# Patient Record
Sex: FEMALE | Race: BLACK OR AFRICAN AMERICAN | NOT HISPANIC OR LATINO | Employment: FULL TIME | RURAL
[De-identification: names, ages, dates, MRNs, and addresses within clinical notes are randomized per-mention and may not be internally consistent; named-entity substitution may affect disease eponyms.]

---

## 2021-08-10 ENCOUNTER — OFFICE VISIT (OUTPATIENT)
Dept: FAMILY MEDICINE | Facility: CLINIC | Age: 31
End: 2021-08-10
Payer: OTHER GOVERNMENT

## 2021-08-10 DIAGNOSIS — Z11.52 ENCOUNTER FOR SCREENING FOR COVID-19: Primary | ICD-10-CM

## 2021-08-10 PROCEDURE — 87635 SARS-COV-2 (COVID-19) QUALITATIVE PCR: ICD-10-PCS | Mod: ,,, | Performed by: CLINICAL MEDICAL LABORATORY

## 2021-08-10 PROCEDURE — 99202 PR OFFICE/OUTPT VISIT, NEW, LEVL II, 15-29 MIN: ICD-10-PCS | Mod: GC,,, | Performed by: FAMILY MEDICINE

## 2021-08-10 PROCEDURE — 87635 SARS-COV-2 COVID-19 AMP PRB: CPT | Mod: ,,, | Performed by: CLINICAL MEDICAL LABORATORY

## 2021-08-10 PROCEDURE — 99202 OFFICE O/P NEW SF 15 MIN: CPT | Mod: GC,,, | Performed by: FAMILY MEDICINE

## 2021-08-11 LAB — SARS-COV-2 RNA RESP QL NAA+PROBE: NEGATIVE

## 2022-09-19 ENCOUNTER — LAB VISIT (OUTPATIENT)
Dept: PRIMARY CARE CLINIC | Facility: CLINIC | Age: 32
End: 2022-09-19

## 2022-09-19 DIAGNOSIS — Z02.83 ENCOUNTER FOR EMPLOYMENT-RELATED DRUG TESTING: Primary | ICD-10-CM

## 2022-09-19 PROCEDURE — 99000 SPECIMEN HANDLING OFFICE-LAB: CPT | Mod: ,,, | Performed by: NURSE PRACTITIONER

## 2022-09-19 PROCEDURE — 99000 PR SPECIMEN HANDLING,DR OFF->LAB: ICD-10-PCS | Mod: ,,, | Performed by: NURSE PRACTITIONER

## 2022-09-19 NOTE — PROGRESS NOTES
Subjective:       Patient ID: Namita Shelby is a 31 y.o. female.    Chief Complaint: No chief complaint on file.    HPI  Review of Systems      Objective:      Physical Exam    Assessment:       Problem List Items Addressed This Visit    None  Visit Diagnoses       Encounter for employment-related drug testing    -  Primary            Plan:       Drug testing only

## 2022-12-27 ENCOUNTER — OFFICE VISIT (OUTPATIENT)
Dept: FAMILY MEDICINE | Facility: CLINIC | Age: 32
End: 2022-12-27
Payer: MEDICAID

## 2022-12-27 VITALS
DIASTOLIC BLOOD PRESSURE: 63 MMHG | SYSTOLIC BLOOD PRESSURE: 109 MMHG | TEMPERATURE: 98 F | HEIGHT: 64 IN | BODY MASS INDEX: 27.11 KG/M2 | OXYGEN SATURATION: 99 % | WEIGHT: 158.81 LBS | HEART RATE: 79 BPM

## 2022-12-27 DIAGNOSIS — R30.0 DYSURIA: ICD-10-CM

## 2022-12-27 DIAGNOSIS — N30.01 ACUTE CYSTITIS WITH HEMATURIA: Primary | ICD-10-CM

## 2022-12-27 DIAGNOSIS — E04.9 GOITER: ICD-10-CM

## 2022-12-27 DIAGNOSIS — J02.9 SORE THROAT: ICD-10-CM

## 2022-12-27 DIAGNOSIS — E04.9 ENLARGED THYROID: ICD-10-CM

## 2022-12-27 LAB
BILIRUB SERPL-MCNC: NEGATIVE MG/DL
BLOOD URINE, POC: ABNORMAL
COLOR, POC UA: ABNORMAL
GLUCOSE UR QL STRIP: NEGATIVE
KETONES UR QL STRIP: ABNORMAL
LEUKOCYTE ESTERASE URINE, POC: ABNORMAL
NITRITE, POC UA: POSITIVE
PH, POC UA: 7
PROTEIN, POC: ABNORMAL
SPECIFIC GRAVITY, POC UA: 1.02
T4 FREE SERPL-MCNC: 0.96 NG/DL (ref 0.76–1.46)
TSH SERPL DL<=0.005 MIU/L-ACNC: 1.08 UIU/ML (ref 0.36–3.74)
UROBILINOGEN, POC UA: 2

## 2022-12-27 PROCEDURE — 87186 CULTURE, URINE: ICD-10-PCS | Mod: ,,, | Performed by: CLINICAL MEDICAL LABORATORY

## 2022-12-27 PROCEDURE — 84443 THYROID PANEL: ICD-10-PCS | Mod: ,,, | Performed by: CLINICAL MEDICAL LABORATORY

## 2022-12-27 PROCEDURE — 81003 URINALYSIS AUTO W/O SCOPE: CPT | Mod: QW,RHCUB | Performed by: NURSE PRACTITIONER

## 2022-12-27 PROCEDURE — 99051 PR MEDICAL SERVICES, EVE/WKEND/HOLIDAY: ICD-10-PCS | Mod: ,,, | Performed by: NURSE PRACTITIONER

## 2022-12-27 PROCEDURE — 96372 THER/PROPH/DIAG INJ SC/IM: CPT | Mod: ,,, | Performed by: NURSE PRACTITIONER

## 2022-12-27 PROCEDURE — 99051 MED SERV EVE/WKEND/HOLIDAY: CPT | Mod: ,,, | Performed by: NURSE PRACTITIONER

## 2022-12-27 PROCEDURE — 87086 CULTURE, URINE: ICD-10-PCS | Mod: ,,, | Performed by: CLINICAL MEDICAL LABORATORY

## 2022-12-27 PROCEDURE — 96372 PR INJECTION,THERAP/PROPH/DIAG2ST, IM OR SUBCUT: ICD-10-PCS | Mod: ,,, | Performed by: NURSE PRACTITIONER

## 2022-12-27 PROCEDURE — 87186 SC STD MICRODIL/AGAR DIL: CPT | Mod: ,,, | Performed by: CLINICAL MEDICAL LABORATORY

## 2022-12-27 PROCEDURE — 87077 CULTURE, URINE: ICD-10-PCS | Mod: ,,, | Performed by: CLINICAL MEDICAL LABORATORY

## 2022-12-27 PROCEDURE — 84439 THYROID PANEL: ICD-10-PCS | Mod: ,,, | Performed by: CLINICAL MEDICAL LABORATORY

## 2022-12-27 PROCEDURE — 99214 OFFICE O/P EST MOD 30 MIN: CPT | Mod: 25,,, | Performed by: NURSE PRACTITIONER

## 2022-12-27 PROCEDURE — 87077 CULTURE AEROBIC IDENTIFY: CPT | Mod: ,,, | Performed by: CLINICAL MEDICAL LABORATORY

## 2022-12-27 PROCEDURE — 99214 PR OFFICE/OUTPT VISIT, EST, LEVL IV, 30-39 MIN: ICD-10-PCS | Mod: 25,,, | Performed by: NURSE PRACTITIONER

## 2022-12-27 PROCEDURE — 87086 URINE CULTURE/COLONY COUNT: CPT | Mod: ,,, | Performed by: CLINICAL MEDICAL LABORATORY

## 2022-12-27 PROCEDURE — 84443 ASSAY THYROID STIM HORMONE: CPT | Mod: ,,, | Performed by: CLINICAL MEDICAL LABORATORY

## 2022-12-27 PROCEDURE — 84439 ASSAY OF FREE THYROXINE: CPT | Mod: ,,, | Performed by: CLINICAL MEDICAL LABORATORY

## 2022-12-27 RX ORDER — CEFTRIAXONE 1 G/1
1 INJECTION, POWDER, FOR SOLUTION INTRAMUSCULAR; INTRAVENOUS
Status: COMPLETED | OUTPATIENT
Start: 2022-12-27 | End: 2022-12-27

## 2022-12-27 RX ORDER — CEFUROXIME AXETIL 500 MG/1
500 TABLET ORAL 2 TIMES DAILY
Qty: 20 TABLET | Refills: 0 | Status: SHIPPED | OUTPATIENT
Start: 2022-12-27 | End: 2023-01-06

## 2022-12-27 RX ADMIN — CEFTRIAXONE 1 G: 1 INJECTION, POWDER, FOR SOLUTION INTRAMUSCULAR; INTRAVENOUS at 07:12

## 2022-12-28 NOTE — PROGRESS NOTES
"Subjective:       Patient ID: Namita Shelby is a 32 y.o. female.    Chief Complaint: Sore Throat (Left side of throat has been hurting for 1 1/2 wks) and Dysuria (Pain when urinating x 3 days)    Presents to clinic as above. No fever. On menses now.    Review of Systems   Constitutional:  Negative for chills, fever and malaise/fatigue.   HENT:  Positive for sore throat. Negative for congestion and ear discharge.    Respiratory: Negative.     Cardiovascular: Negative.    Gastrointestinal: Negative.    Genitourinary:  Positive for dysuria, frequency, hematuria and urgency. Negative for flank pain.        Reviewed family, medical, surgical, and social history.    Objective:      /63   Pulse 79   Temp 98.1 °F (36.7 °C)   Ht 5' 4" (1.626 m)   Wt 72 kg (158 lb 12.8 oz)   LMP 12/27/2022   SpO2 99%   BMI 27.26 kg/m²   Physical Exam  Vitals and nursing note reviewed.   Constitutional:       General: She is not in acute distress.     Appearance: Normal appearance. She is not ill-appearing, toxic-appearing or diaphoretic.   HENT:      Head: Normocephalic.      Right Ear: Tympanic membrane, ear canal and external ear normal.      Left Ear: Tympanic membrane, ear canal and external ear normal.      Nose: No congestion or rhinorrhea.      Mouth/Throat:      Mouth: Mucous membranes are moist.      Pharynx: Posterior oropharyngeal erythema present. No oropharyngeal exudate.   Neck:      Vascular: No carotid bruit.      Comments: Mild tenderness to left anterior cervical region. No lymphadenopathy. Thyroid enlarged and spongy.   Cardiovascular:      Rate and Rhythm: Normal rate and regular rhythm.      Heart sounds: Normal heart sounds.   Pulmonary:      Effort: Pulmonary effort is normal.      Breath sounds: Normal breath sounds.   Abdominal:      General: Abdomen is flat. Bowel sounds are normal. There is no distension.      Palpations: Abdomen is soft. There is no mass.      Tenderness: There is no abdominal " tenderness. There is no right CVA tenderness, left CVA tenderness, guarding or rebound.      Hernia: No hernia is present.   Musculoskeletal:      Cervical back: Normal range of motion and neck supple. Tenderness present. No rigidity.   Lymphadenopathy:      Cervical: No cervical adenopathy.   Skin:     General: Skin is warm and dry.      Capillary Refill: Capillary refill takes less than 2 seconds.   Neurological:      Mental Status: She is alert and oriented to person, place, and time.   Psychiatric:         Mood and Affect: Mood normal.         Behavior: Behavior normal.         Thought Content: Thought content normal.         Judgment: Judgment normal.          Office Visit on 12/27/2022   Component Date Value Ref Range Status    Color, UA 12/27/2022 Red   Final    Spec Grav UA 12/27/2022 1.025   Final    pH, UA 12/27/2022 7.0   Final    WBC, UA 12/27/2022 Small   Final    Nitrite, UA 12/27/2022 Positive   Final    Protein, POC 12/27/2022 100 mg   Final    Glucose, UA 12/27/2022 Negative   Final    Ketones, UA 12/27/2022 Trace   Final    Bilirubin, POC 12/27/2022 Negative   Final    Urobilinogen, UA 12/27/2022 2.0   Final    Blood, UA 12/27/2022 Large   Final    Free T4 12/27/2022 0.96  0.76 - 1.46 ng/dL Final    TSH 12/27/2022 1.080  0.358 - 3.740 uIU/mL Final      Assessment:       1. Acute cystitis with hematuria    2. Dysuria    3. Sore throat    4. Goiter          Plan:       Acute cystitis with hematuria  -     cefTRIAXone injection 1 g  -     cefUROXime (CEFTIN) 500 MG tablet; Take 1 tablet (500 mg total) by mouth 2 (two) times daily. for 10 days  Dispense: 20 tablet; Refill: 0  -     Urine culture; Future; Expected date: 12/27/2022    Dysuria  -     POCT URINALYSIS W/O SCOPE    Sore throat  -     cefTRIAXone injection 1 g  -     cefUROXime (CEFTIN) 500 MG tablet; Take 1 tablet (500 mg total) by mouth 2 (two) times daily. for 10 days  Dispense: 20 tablet; Refill: 0  -     Thyroid Panel; Future; Expected  date: 12/27/2022  -     US Thyroid; Future; Expected date: 12/27/2022    Goiter  -     Thyroid Panel; Future; Expected date: 12/27/2022  -     US Thyroid; Future; Expected date: 12/27/2022    F/U with referral clerk in 1 week  Drink plenty of fluids  RTC PRN          Risks, benefits, and side effects were discussed with the patient. All questions were answered to the fullest satisfaction of the patient, and pt verbalized understanding and agreement to treatment plan. Pt was to call with any new or worsening symptoms, or present to the ER.

## 2022-12-29 ENCOUNTER — HOSPITAL ENCOUNTER (EMERGENCY)
Facility: HOSPITAL | Age: 32
Discharge: HOME | End: 2022-12-29
Payer: MEDICAID

## 2022-12-29 VITALS
DIASTOLIC BLOOD PRESSURE: 58 MMHG | OXYGEN SATURATION: 99 % | TEMPERATURE: 98 F | SYSTOLIC BLOOD PRESSURE: 119 MMHG | HEIGHT: 64 IN | BODY MASS INDEX: 26.63 KG/M2 | WEIGHT: 156 LBS | RESPIRATION RATE: 16 BRPM | HEART RATE: 75 BPM

## 2022-12-29 DIAGNOSIS — E01.0 THYROMEGALY: Primary | ICD-10-CM

## 2022-12-29 LAB
ANION GAP SERPL CALCULATED.3IONS-SCNC: 11 MMOL/L (ref 7–16)
BASOPHILS # BLD AUTO: 0.03 K/UL (ref 0–0.2)
BASOPHILS NFR BLD AUTO: 0.5 % (ref 0–1)
BUN SERPL-MCNC: 9 MG/DL (ref 7–18)
BUN/CREAT SERPL: 16 (ref 6–20)
CALCIUM SERPL-MCNC: 9 MG/DL (ref 8.5–10.1)
CHLORIDE SERPL-SCNC: 104 MMOL/L (ref 98–107)
CO2 SERPL-SCNC: 28 MMOL/L (ref 21–32)
CREAT SERPL-MCNC: 0.58 MG/DL (ref 0.55–1.02)
DIFFERENTIAL METHOD BLD: ABNORMAL
EGFR (NO RACE VARIABLE) (RUSH/TITUS): 123 ML/MIN/1.73M²
EOSINOPHIL # BLD AUTO: 0.17 K/UL (ref 0–0.5)
EOSINOPHIL NFR BLD AUTO: 2.6 % (ref 1–4)
ERYTHROCYTE [DISTWIDTH] IN BLOOD BY AUTOMATED COUNT: 16.4 % (ref 11.5–14.5)
GLUCOSE SERPL-MCNC: 102 MG/DL (ref 74–106)
HCT VFR BLD AUTO: 36.2 % (ref 38–47)
HGB BLD-MCNC: 11.4 G/DL (ref 12–16)
IMM GRANULOCYTES # BLD AUTO: 0.01 K/UL (ref 0–0.04)
IMM GRANULOCYTES NFR BLD: 0.2 % (ref 0–0.4)
LYMPHOCYTES # BLD AUTO: 2.91 K/UL (ref 1–4.8)
LYMPHOCYTES NFR BLD AUTO: 44.2 % (ref 27–41)
MCH RBC QN AUTO: 25.9 PG (ref 27–31)
MCHC RBC AUTO-ENTMCNC: 31.5 G/DL (ref 32–36)
MCV RBC AUTO: 82.3 FL (ref 80–96)
MONOCYTES # BLD AUTO: 0.41 K/UL (ref 0–0.8)
MONOCYTES NFR BLD AUTO: 6.2 % (ref 2–6)
MPC BLD CALC-MCNC: 9.8 FL (ref 9.4–12.4)
NEUTROPHILS # BLD AUTO: 3.06 K/UL (ref 1.8–7.7)
NEUTROPHILS NFR BLD AUTO: 46.3 % (ref 53–65)
NRBC # BLD AUTO: 0 X10E3/UL
NRBC, AUTO (.00): 0 %
PLATELET # BLD AUTO: 337 K/UL (ref 150–400)
POTASSIUM SERPL-SCNC: 4.2 MMOL/L (ref 3.5–5.1)
RBC # BLD AUTO: 4.4 M/UL (ref 4.2–5.4)
SODIUM SERPL-SCNC: 139 MMOL/L (ref 136–145)
UA COMPLETE W REFLEX CULTURE PNL UR: ABNORMAL
WBC # BLD AUTO: 6.59 K/UL (ref 4.5–11)

## 2022-12-29 PROCEDURE — 80048 BASIC METABOLIC PNL TOTAL CA: CPT | Performed by: NURSE PRACTITIONER

## 2022-12-29 PROCEDURE — 99285 EMERGENCY DEPT VISIT HI MDM: CPT | Mod: 25

## 2022-12-29 PROCEDURE — 85025 COMPLETE CBC W/AUTO DIFF WBC: CPT | Performed by: NURSE PRACTITIONER

## 2022-12-29 PROCEDURE — 99284 EMERGENCY DEPT VISIT MOD MDM: CPT | Mod: ,,, | Performed by: NURSE PRACTITIONER

## 2022-12-29 PROCEDURE — 25500020 PHARM REV CODE 255: Performed by: NURSE PRACTITIONER

## 2022-12-29 PROCEDURE — 99284 PR EMERGENCY DEPT VISIT,LEVEL IV: ICD-10-PCS | Mod: ,,, | Performed by: NURSE PRACTITIONER

## 2022-12-29 PROCEDURE — 36415 COLL VENOUS BLD VENIPUNCTURE: CPT | Performed by: NURSE PRACTITIONER

## 2022-12-29 RX ADMIN — IOPAMIDOL 100 ML: 755 INJECTION, SOLUTION INTRAVENOUS at 03:12

## 2022-12-29 NOTE — DISCHARGE INSTRUCTIONS
Follow up with your primary care provider for continued care and management of your CT results. Return to the ED for worsening signs and symptoms or otherwise as needed.

## 2022-12-29 NOTE — ED PROVIDER NOTES
Encounter Date: 12/29/2022       History     Chief Complaint   Patient presents with    Sore Throat     33 y/o AAF with no known PMH presents with c/o sore throat, specifically localized to left side. States it feels swollen and painful with swallowing. Symptoms on going for the past 1.5-2 weeks.  Denies fevers, chills, shortness of breath. Started on ceftin two days ago by PCP for sore throat and UTI. States she was instructed to come to the ED if she had no improvement or if she thought her throat was getting worse. Note, chart review reveals she had Thyroid panel that was normal 2 days ago.     The history is provided by the patient and medical records.   Review of patient's allergies indicates:  No Known Allergies  No past medical history on file.  No past surgical history on file.  No family history on file.  Social History     Tobacco Use    Smoking status: Every Day     Types: Cigarettes     Passive exposure: Never    Smokeless tobacco: Never   Substance Use Topics    Alcohol use: Never    Drug use: Never     Review of Systems   Constitutional:  Negative for chills and fever.   HENT:  Positive for sore throat. Negative for trouble swallowing.    Respiratory:  Negative for cough, choking, shortness of breath, wheezing and stridor.    Cardiovascular:  Negative for chest pain and palpitations.   Gastrointestinal:  Negative for nausea and vomiting.   All other systems reviewed and are negative.    Physical Exam     Initial Vitals [12/29/22 1359]   BP Pulse Resp Temp SpO2   (!) 119/58 75 16 98 °F (36.7 °C) 99 %      MAP       --         Physical Exam    Constitutional: She appears well-developed and well-nourished. She is cooperative.   HENT:   Mouth/Throat: Mucous membranes are normal. Posterior oropharyngeal erythema present. No oropharyngeal exudate or posterior oropharyngeal edema.   Neck: Thyromegaly present.   Cardiovascular:  Normal rate, regular rhythm, normal heart sounds and normal pulses.            Pulmonary/Chest: Effort normal and breath sounds normal.   Abdominal: Abdomen is soft. Bowel sounds are normal. There is no abdominal tenderness.     Neurological: She is alert and oriented to person, place, and time.   Skin: Skin is warm, dry and intact. Capillary refill takes less than 2 seconds.   Psychiatric: She has a normal mood and affect. Her speech is normal and behavior is normal. Judgment and thought content normal. Cognition and memory are normal.       Medical Screening Exam   See Full Note    ED Course   Procedures  Labs Reviewed   CBC WITH DIFFERENTIAL - Abnormal; Notable for the following components:       Result Value    Hemoglobin 11.4 (*)     Hematocrit 36.2 (*)     MCH 25.9 (*)     MCHC 31.5 (*)     RDW 16.4 (*)     Neutrophils % 46.3 (*)     Lymphocytes % 44.2 (*)     Monocytes % 6.2 (*)     All other components within normal limits   BASIC METABOLIC PANEL - Normal   CBC W/ AUTO DIFFERENTIAL    Narrative:     The following orders were created for panel order CBC auto differential.  Procedure                               Abnormality         Status                     ---------                               -----------         ------                     CBC with Differential[[id number]]        Abnormal            Final result                 Please view results for these tests on the individual orders.          Imaging Results              CT Soft Tissue Neck With Contrast (Final result)  Result time 12/29/22 15:24:47      Final result by Wesley Batres MD (12/29/22 15:24:47)                   Impression:      37 mm mass at the left thoracic inlet which causes some deviation of the trachea to the right.  Ectopic thyroid goiter is suspected.  Consider obtaining nonemergent outpatient thyroid scan to document iodine uptake and establish thyroid origin.  Otherwise, nonemergent tissue sampling is recommended to help exclude neoplasm    There is no tonsillar abscess or definite acute  inflammatory process otherwise      Electronically signed by: Wesley Medardo  Date:    12/29/2022  Time:    15:24               Narrative:    EXAMINATION:  CT SOFT TISSUE NECK WITH CONTRAST    CLINICAL HISTORY:  Lymphadenopathy, neck;Goiter;sore throat; painful swallowing;.    COMPARISON:  No previous similar imaging study available    TECHNIQUE:  Spiral CT sections were obtained through the soft tissue neck following the IV administration of 100 mL of Isovue-370 without immediate complication. Sagittal and coronal multiplanar reconstruction images are also examined.    The CT examination was performed using one or more of the following dose reduction techniques: Automated exposure control, adjustment of the mA and kV according to patient's size, use of acute or iterative reconstruction techniques.    FINDINGS:  There is a heterogeneously dense soft tissue mass measuring 37 by 33 by 23 mm at the left thoracic inlet in a substernal location.  This comes in close proximity to the inferior aspect of the left lobe of the thyroid but does not definitely connect to the thyroid gland itself.  This is hyperdense to muscle but hypodense to normal thyroid tissue.  This does cause some deviation of the trachea to the right at the thoracic inlet.  There is no significant narrowing of the trachea.  This could represent ectopic thyroid goiter.  There is no fat or calcium density within this lesion to specifically suggest teratoma.  Nonemergent tissue sampling is recommended.  One could consider obtaining nonemergent outpatient thyroid scan to determine if there is iodine uptake within this lesion to confirm thyroid origin.    There is mild diffuse prominence of the thyroid gland itself otherwise.    There is no pathologic lymphadenopathy at the cervical level.  There is an occasional small shotty cervical lymph node which could be reactive in nature.    Larynx appears normal.  There is no tonsillar abscess.    There is normal  enhancement of the major vascular structures.    Partially visualized lung apices are clear.    There is no acute osseous abnormality.  There is reversal of the normal cervical lordosis which could be related to patient positioning or muscle spasm.                                       Medications   iopamidoL (ISOVUE-370) injection 100 mL (100 mLs Intravenous Given 12/29/22 0188)     Medical Decision Making:   Discussed CT findings with Dr. Galeas, ED Attending, will instruct patient to f/u with PCP for further testing and continued care and management. He is agreeable with tx plan.                  Clinical Impression:   Final diagnoses:  [E01.0] Thyromegaly (Primary)        ED Disposition Condition    Discharge Stable          ED Prescriptions    None       Follow-up Information       Follow up With Specialties Details Why Contact Info    Primary Care Provider  In 1 week               RONALD Pollard  12/29/22 3613       RONALD Pollard  12/29/22 7892

## 2022-12-29 NOTE — ED TRIAGE NOTES
Addended by: TORRI BOYCE on: 5/17/2017 11:17 AM     Modules accepted: Basilio Fernández     PRESENTS TO ER WITH REPORT OF SORE THROAT X 2 WEEKS. WENT TO [facility] THEN AND WAS TOLD TO COME TO [facility] IF PAIN GOT WORSE. REPORTS CURRENTLY ON ABX FOR UTI

## 2023-01-09 ENCOUNTER — HOSPITAL ENCOUNTER (OUTPATIENT)
Dept: RADIOLOGY | Facility: HOSPITAL | Age: 33
Discharge: HOME | End: 2023-01-09
Attending: NURSE PRACTITIONER
Payer: MEDICAID

## 2023-01-09 DIAGNOSIS — J02.9 SORE THROAT: ICD-10-CM

## 2023-01-09 DIAGNOSIS — E04.9 GOITER: ICD-10-CM

## 2023-01-09 PROCEDURE — 76536 US EXAM OF HEAD AND NECK: CPT | Mod: 26,,, | Performed by: RADIOLOGY

## 2023-01-09 PROCEDURE — 76536 US THYROID: ICD-10-PCS | Mod: 26,,, | Performed by: RADIOLOGY

## 2023-01-09 PROCEDURE — 76536 US EXAM OF HEAD AND NECK: CPT | Mod: TC

## 2023-01-10 ENCOUNTER — TELEPHONE (OUTPATIENT)
Dept: FAMILY MEDICINE | Facility: CLINIC | Age: 33
End: 2023-01-10
Payer: MEDICAID

## 2023-01-10 NOTE — TELEPHONE ENCOUNTER
Pt notified. Voiced understanding.----- Message from RONALD Montero sent at 1/9/2023  3:25 PM CST -----  Notify of mildly enlarged thyroid. I am referring to general surgery to have this followed. Thanks

## 2023-01-17 ENCOUNTER — OFFICE VISIT (OUTPATIENT)
Dept: SURGERY | Facility: CLINIC | Age: 33
End: 2023-01-17
Attending: SURGERY
Payer: MEDICAID

## 2023-01-17 DIAGNOSIS — E04.9 ENLARGED THYROID: ICD-10-CM

## 2023-01-17 DIAGNOSIS — E04.9 GOITER: ICD-10-CM

## 2023-01-17 DIAGNOSIS — E04.1 THYROID NODULE: Primary | ICD-10-CM

## 2023-01-17 PROCEDURE — 1159F MED LIST DOCD IN RCRD: CPT | Mod: CPTII,,, | Performed by: SURGERY

## 2023-01-17 PROCEDURE — 1159F PR MEDICATION LIST DOCUMENTED IN MEDICAL RECORD: ICD-10-PCS | Mod: CPTII,,, | Performed by: SURGERY

## 2023-01-17 PROCEDURE — 99203 PR OFFICE/OUTPT VISIT, NEW, LEVL III, 30-44 MIN: ICD-10-PCS | Mod: S$PBB,,, | Performed by: SURGERY

## 2023-01-17 PROCEDURE — 99203 OFFICE O/P NEW LOW 30 MIN: CPT | Mod: S$PBB,,, | Performed by: SURGERY

## 2023-01-17 PROCEDURE — 99213 OFFICE O/P EST LOW 20 MIN: CPT | Mod: PBBFAC | Performed by: SURGERY

## 2023-01-17 NOTE — PROGRESS NOTES
General Surgery History and Physical      Patient ID: Namita Morris is a 32 y.o. female.    Chief Complaint: Thyroid Problem      HPI:  32-year-old female who had a CT scan done recently for this persistent sore throat.  The scan revealed this substernal lesion likely an extension of her thyroid that was that was causing tracheal deviation towards the right.  She does complain of some mild dysphagia.  But does not feel a lump no pain.  She is no family history of thyroid cancer and never had a previous radiation to her neck.  No signs of hyper or hypothyroidism no changes in energy, weight fluctuations, bowel habit changes.    Review of Systems   Constitutional:  Negative for activity change, appetite change, fatigue and fever.   HENT:  Positive for sore throat and trouble swallowing.    Respiratory:  Negative for cough and shortness of breath.    Cardiovascular:  Negative for chest pain and palpitations.   Gastrointestinal:  Negative for abdominal distention, abdominal pain, blood in stool, constipation and diarrhea.   Genitourinary:  Negative for flank pain.   Musculoskeletal:  Negative for neck pain and neck stiffness.   Neurological:  Negative for weakness.     No current outpatient medications on file.     No current facility-administered medications for this visit.       Review of patient's allergies indicates:  No Known Allergies    History reviewed. No pertinent past medical history.    History reviewed. No pertinent surgical history.    History reviewed. No pertinent family history.    Social History     Socioeconomic History    Marital status: Single   Tobacco Use    Smoking status: Every Day     Types: Cigarettes     Passive exposure: Never    Smokeless tobacco: Never   Substance and Sexual Activity    Alcohol use: Never    Drug use: Never    Sexual activity: Yes       There were no vitals filed for this  visit.    Physical Exam  Constitutional:       General: She is not in acute distress.  HENT:      Head: Normocephalic.   Cardiovascular:      Rate and Rhythm: Normal rate and regular rhythm.      Pulses: Normal pulses.   Pulmonary:      Effort: Pulmonary effort is normal. No respiratory distress.      Breath sounds: Normal breath sounds.   Abdominal:      General: Abdomen is flat. There is no distension.      Palpations: Abdomen is soft.      Tenderness: There is no abdominal tenderness.   Musculoskeletal:         General: Normal range of motion.   Skin:     General: Skin is warm.   Neurological:      General: No focal deficit present.      Mental Status: She is oriented to person, place, and time.     EXAMINATION:  CT SOFT TISSUE NECK WITH CONTRAST     CLINICAL HISTORY:  Lymphadenopathy, neck;Goiter;sore throat; painful swallowing;.     COMPARISON:  No previous similar imaging study available     TECHNIQUE:  Spiral CT sections were obtained through the soft tissue neck following the IV administration of 100 mL of Isovue-370 without immediate complication. Sagittal and coronal multiplanar reconstruction images are also examined.     The CT examination was performed using one or more of the following dose reduction techniques: Automated exposure control, adjustment of the mA and kV according to patient's size, use of acute or iterative reconstruction techniques.     FINDINGS:  There is a heterogeneously dense soft tissue mass measuring 37 by 33 by 23 mm at the left thoracic inlet in a substernal location.  This comes in close proximity to the inferior aspect of the left lobe of the thyroid but does not definitely connect to the thyroid gland itself.  This is hyperdense to muscle but hypodense to normal thyroid tissue.  This does cause some deviation of the trachea to the right at the thoracic inlet.  There is no significant narrowing of the trachea.  This could represent ectopic thyroid goiter.  There is no fat or  calcium density within this lesion to specifically suggest teratoma.  Nonemergent tissue sampling is recommended.  One could consider obtaining nonemergent outpatient thyroid scan to determine if there is iodine uptake within this lesion to confirm thyroid origin.     There is mild diffuse prominence of the thyroid gland itself otherwise.     There is no pathologic lymphadenopathy at the cervical level.  There is an occasional small shotty cervical lymph node which could be reactive in nature.     Larynx appears normal.  There is no tonsillar abscess.     There is normal enhancement of the major vascular structures.     Partially visualized lung apices are clear.     There is no acute osseous abnormality.  There is reversal of the normal cervical lordosis which could be related to patient positioning or muscle spasm.     Impression:     37 mm mass at the left thoracic inlet which causes some deviation of the trachea to the right.  Ectopic thyroid goiter is suspected.  Consider obtaining nonemergent outpatient thyroid scan to document iodine uptake and establish thyroid origin.  Otherwise, nonemergent tissue sampling is recommended to help exclude neoplasm     There is no tonsillar abscess or definite acute inflammatory process otherwise        Electronically signed by: Wesley Batres  Date:                                            12/29/2022  Time:                                           15:24    Assessment & Plan:    Thyroid nodule  -     US FNA Biopsy w/ Imaging 1st Lesion; Future; Expected date: 01/17/2023    Goiter  -     Ambulatory referral/consult to General Surgery    Enlarged thyroid  -     Ambulatory referral/consult to General Surgery        Will send the patient for an FNA of this lesion hopefully ultrasound-guided to assess for what it is before considering operating.  Likely would need 1 regardless due to the size and the compression factor.         Patient called about the results of her FNA  which came back as thyroid tissue.  She would like to wait for now and she will come back in 6 months time for repeat ultrasound and will consider further evaluation possible esophagram to see her swallowing and see if this needs to be excised.  All questions were answered.

## 2023-01-30 ENCOUNTER — HOSPITAL ENCOUNTER (OUTPATIENT)
Dept: RADIOLOGY | Facility: HOSPITAL | Age: 33
Discharge: HOME | End: 2023-01-30
Attending: SURGERY
Payer: MEDICAID

## 2023-01-30 VITALS
SYSTOLIC BLOOD PRESSURE: 134 MMHG | DIASTOLIC BLOOD PRESSURE: 79 MMHG | RESPIRATION RATE: 20 BRPM | HEART RATE: 83 BPM | OXYGEN SATURATION: 100 %

## 2023-01-30 DIAGNOSIS — E04.1 THYROID NODULE: ICD-10-CM

## 2023-01-30 PROCEDURE — A4550 SURGICAL TRAYS: HCPCS

## 2023-01-30 PROCEDURE — 88305 TISSUE EXAM BY PATHOLOGIST: CPT | Mod: TC,MCY | Performed by: RADIOLOGY

## 2023-01-30 PROCEDURE — 88173 CYTOLOGY, FNA: ICD-10-PCS | Mod: 26,,, | Performed by: PATHOLOGY

## 2023-01-30 PROCEDURE — 10005 FNA BX W/US GDN 1ST LES: CPT

## 2023-01-30 PROCEDURE — 88173 CYTOPATH EVAL FNA REPORT: CPT | Mod: TC,MCY | Performed by: RADIOLOGY

## 2023-01-30 PROCEDURE — 10005 FNA BX W/US GDN 1ST LES: CPT | Mod: ,,, | Performed by: RADIOLOGY

## 2023-01-30 PROCEDURE — 88305 CYTOLOGY, FNA: ICD-10-PCS | Mod: 26,,, | Performed by: PATHOLOGY

## 2023-01-30 PROCEDURE — 88305 TISSUE EXAM BY PATHOLOGIST: CPT | Mod: 26,,, | Performed by: PATHOLOGY

## 2023-01-30 PROCEDURE — 88173 CYTOPATH EVAL FNA REPORT: CPT | Mod: 26,,, | Performed by: PATHOLOGY

## 2023-01-30 PROCEDURE — 10005 US FINE NEEDLE ASPIRATION BIOPSY, FIRST LESION: ICD-10-PCS | Mod: ,,, | Performed by: RADIOLOGY

## 2023-01-30 NOTE — PROGRESS NOTES
Left Thyroid/sub clavicular nodule FNA  Performed by Dr. Fito Espana  Consent obtained for fine-needle aspiration.  A formal timeout was called all staff present agreed to patient and procedure.  The neck was prepped with ChloraPrep and sterile field was established.  1% lidocaine was used as local anesthetic.  Under ultrasound guidance 3 fine-needle aspirations were taken from the subclavicular nodule on the left.  The puncture site was cleaned and bandaged.  The patient tolerated the procedure well there are no immediate postprocedure complications.

## 2023-01-31 LAB
ESTROGEN SERPL-MCNC: NORMAL PG/ML
LAB AP CLINICAL INFORMATION: NORMAL
LAB AP COMMENTS: NORMAL
LAB AP GROSS DESCRIPTION: NORMAL
T3RU NFR SERPL: NORMAL %

## 2023-02-08 DIAGNOSIS — E04.1 THYROID NODULE: Primary | ICD-10-CM

## 2023-03-25 ENCOUNTER — OFFICE VISIT (OUTPATIENT)
Dept: FAMILY MEDICINE | Facility: CLINIC | Age: 33
End: 2023-03-25
Payer: MEDICAID

## 2023-03-25 VITALS
OXYGEN SATURATION: 99 % | WEIGHT: 160 LBS | TEMPERATURE: 99 F | SYSTOLIC BLOOD PRESSURE: 121 MMHG | HEIGHT: 64 IN | HEART RATE: 91 BPM | RESPIRATION RATE: 18 BRPM | BODY MASS INDEX: 27.31 KG/M2 | DIASTOLIC BLOOD PRESSURE: 72 MMHG

## 2023-03-25 DIAGNOSIS — J32.9 SINUSITIS, UNSPECIFIED CHRONICITY, UNSPECIFIED LOCATION: ICD-10-CM

## 2023-03-25 DIAGNOSIS — J02.8 SORE THROAT (VIRAL): ICD-10-CM

## 2023-03-25 DIAGNOSIS — H10.10 ALLERGIC CONJUNCTIVITIS, UNSPECIFIED LATERALITY: ICD-10-CM

## 2023-03-25 DIAGNOSIS — J02.0 STREP THROAT: Primary | ICD-10-CM

## 2023-03-25 DIAGNOSIS — Z20.828 VIRAL DISEASE EXPOSURE: ICD-10-CM

## 2023-03-25 DIAGNOSIS — B97.89 SORE THROAT (VIRAL): ICD-10-CM

## 2023-03-25 DIAGNOSIS — R05.9 COUGH, UNSPECIFIED TYPE: ICD-10-CM

## 2023-03-25 LAB
CTP QC/QA: YES
CTP QC/QA: YES
FLUAV AG NPH QL: NEGATIVE
FLUBV AG NPH QL: NEGATIVE
S PYO RRNA THROAT QL PROBE: POSITIVE
SARS-COV-2 AG RESP QL IA.RAPID: NEGATIVE

## 2023-03-25 PROCEDURE — 96372 PR INJECTION,THERAP/PROPH/DIAG2ST, IM OR SUBCUT: ICD-10-PCS | Mod: ,,, | Performed by: NURSE PRACTITIONER

## 2023-03-25 PROCEDURE — 3078F DIAST BP <80 MM HG: CPT | Mod: CPTII,,, | Performed by: NURSE PRACTITIONER

## 2023-03-25 PROCEDURE — 1160F PR REVIEW ALL MEDS BY PRESCRIBER/CLIN PHARMACIST DOCUMENTED: ICD-10-PCS | Mod: CPTII,,, | Performed by: NURSE PRACTITIONER

## 2023-03-25 PROCEDURE — 96372 THER/PROPH/DIAG INJ SC/IM: CPT | Mod: ,,, | Performed by: NURSE PRACTITIONER

## 2023-03-25 PROCEDURE — 99051 MED SERV EVE/WKEND/HOLIDAY: CPT | Mod: ,,, | Performed by: NURSE PRACTITIONER

## 2023-03-25 PROCEDURE — 3074F SYST BP LT 130 MM HG: CPT | Mod: CPTII,,, | Performed by: NURSE PRACTITIONER

## 2023-03-25 PROCEDURE — 3008F PR BODY MASS INDEX (BMI) DOCUMENTED: ICD-10-PCS | Mod: CPTII,,, | Performed by: NURSE PRACTITIONER

## 2023-03-25 PROCEDURE — 99213 OFFICE O/P EST LOW 20 MIN: CPT | Mod: 25,,, | Performed by: NURSE PRACTITIONER

## 2023-03-25 PROCEDURE — 87880 STREP A ASSAY W/OPTIC: CPT | Mod: RHCUB | Performed by: NURSE PRACTITIONER

## 2023-03-25 PROCEDURE — 1160F RVW MEDS BY RX/DR IN RCRD: CPT | Mod: CPTII,,, | Performed by: NURSE PRACTITIONER

## 2023-03-25 PROCEDURE — 99051 PR MEDICAL SERVICES, EVE/WKEND/HOLIDAY: ICD-10-PCS | Mod: ,,, | Performed by: NURSE PRACTITIONER

## 2023-03-25 PROCEDURE — 3008F BODY MASS INDEX DOCD: CPT | Mod: CPTII,,, | Performed by: NURSE PRACTITIONER

## 2023-03-25 PROCEDURE — 99213 PR OFFICE/OUTPT VISIT, EST, LEVL III, 20-29 MIN: ICD-10-PCS | Mod: 25,,, | Performed by: NURSE PRACTITIONER

## 2023-03-25 PROCEDURE — 3078F PR MOST RECENT DIASTOLIC BLOOD PRESSURE < 80 MM HG: ICD-10-PCS | Mod: CPTII,,, | Performed by: NURSE PRACTITIONER

## 2023-03-25 PROCEDURE — 87428 SARSCOV & INF VIR A&B AG IA: CPT | Mod: RHCUB | Performed by: NURSE PRACTITIONER

## 2023-03-25 PROCEDURE — 1159F PR MEDICATION LIST DOCUMENTED IN MEDICAL RECORD: ICD-10-PCS | Mod: CPTII,,, | Performed by: NURSE PRACTITIONER

## 2023-03-25 PROCEDURE — 3074F PR MOST RECENT SYSTOLIC BLOOD PRESSURE < 130 MM HG: ICD-10-PCS | Mod: CPTII,,, | Performed by: NURSE PRACTITIONER

## 2023-03-25 PROCEDURE — 1159F MED LIST DOCD IN RCRD: CPT | Mod: CPTII,,, | Performed by: NURSE PRACTITIONER

## 2023-03-25 RX ORDER — DEXAMETHASONE SODIUM PHOSPHATE 4 MG/ML
6 INJECTION, SOLUTION INTRA-ARTICULAR; INTRALESIONAL; INTRAMUSCULAR; INTRAVENOUS; SOFT TISSUE
Status: COMPLETED | OUTPATIENT
Start: 2023-03-25 | End: 2023-03-25

## 2023-03-25 RX ORDER — OLOPATADINE HYDROCHLORIDE 1 MG/ML
1 SOLUTION/ DROPS OPHTHALMIC 2 TIMES DAILY
Qty: 5 ML | Refills: 0 | Status: SHIPPED | OUTPATIENT
Start: 2023-03-25 | End: 2023-10-18

## 2023-03-25 RX ORDER — PROMETHAZINE HYDROCHLORIDE AND DEXTROMETHORPHAN HYDROBROMIDE 6.25; 15 MG/5ML; MG/5ML
5 SYRUP ORAL EVERY 6 HOURS PRN
Qty: 150 ML | Refills: 0 | Status: SHIPPED | OUTPATIENT
Start: 2023-03-25 | End: 2023-04-04

## 2023-03-25 RX ORDER — CEFUROXIME AXETIL 500 MG/1
500 TABLET ORAL 2 TIMES DAILY
Qty: 20 TABLET | Refills: 0 | Status: SHIPPED | OUTPATIENT
Start: 2023-03-25 | End: 2023-04-04

## 2023-03-25 RX ADMIN — DEXAMETHASONE SODIUM PHOSPHATE 6 MG: 4 INJECTION, SOLUTION INTRA-ARTICULAR; INTRALESIONAL; INTRAMUSCULAR; INTRAVENOUS; SOFT TISSUE at 12:03

## 2023-03-25 NOTE — LETTER
March 25, 2023      [facility]  [address] [facility] [place] [address]  Phone: [phone]  Fax: [phone]       Patient: Namita Morris   YOB: 1990  Date of Visit: 03/25/2023    To Whom It May Concern:    uSsy Morris  was at [facility] on 03/25/2023. The patient may return to work/school on 3/27/2023 with no restrictions. If you have any questions or concerns, or if I can be of further assistance, please do not hesitate to contact me.    Sincerely,    Ivy Grace MA

## 2023-03-25 NOTE — PROGRESS NOTES
"Subjective:       Patient ID: Namita Morris is a 32 y.o. female.    Chief Complaint: Sore Throat (Patient has had a sore throat for 4 days.), Nasal Congestion (Patient says that she has a lot of nasal drainage and irritation which sometimes produces blood.), Headache (Of and on headache that started 4 days ago.), and Eye Drainage (Patient stated that both of her eyes have been draining. )    Presents to clinic as above. No fever.     Review of Systems   Constitutional:  Negative for chills, fever and malaise/fatigue.   HENT:  Positive for congestion, nosebleeds, sinus pain and sore throat. Negative for ear pain.    Eyes:  Positive for discharge and redness. Negative for blurred vision, double vision, photophobia and pain.   Respiratory:  Positive for cough. Negative for hemoptysis, sputum production, shortness of breath and wheezing.    Cardiovascular: Negative.    Neurological:  Positive for headaches.        Reviewed family, medical, surgical, and social history.    Objective:      /72 (BP Location: Left arm, Patient Position: Sitting, BP Method: Large (Automatic))   Pulse 91   Temp 98.6 °F (37 °C) (Oral)   Resp 18   Ht 5' 4" (1.626 m)   Wt 72.6 kg (160 lb)   LMP 03/06/2023 (Approximate)   SpO2 99%   BMI 27.46 kg/m²   Physical Exam  Vitals and nursing note reviewed.   Constitutional:       General: She is not in acute distress.     Appearance: Normal appearance. She is normal weight. She is not ill-appearing, toxic-appearing or diaphoretic.   HENT:      Head: Normocephalic.      Right Ear: Tympanic membrane, ear canal and external ear normal.      Left Ear: Tympanic membrane, ear canal and external ear normal.      Nose: Mucosal edema, congestion and rhinorrhea present. Rhinorrhea is clear.      Right Sinus: Frontal sinus tenderness present. No maxillary sinus tenderness.      Left Sinus: Frontal sinus tenderness present. No maxillary sinus tenderness.      Mouth/Throat:      Mouth: Mucous " membranes are moist.      Pharynx: Posterior oropharyngeal erythema present. No oropharyngeal exudate.   Eyes:      Comments: Mild redness to bilateral sclera and conjunctiva. Clear drainage.    Cardiovascular:      Rate and Rhythm: Normal rate and regular rhythm.      Heart sounds: Normal heart sounds.   Pulmonary:      Effort: Pulmonary effort is normal.      Breath sounds: Normal breath sounds.   Musculoskeletal:      Cervical back: Normal range of motion and neck supple. No rigidity or tenderness.   Lymphadenopathy:      Cervical: No cervical adenopathy.   Skin:     General: Skin is warm and dry.      Capillary Refill: Capillary refill takes less than 2 seconds.   Neurological:      Mental Status: She is alert and oriented to person, place, and time.   Psychiatric:         Mood and Affect: Mood normal.         Behavior: Behavior normal.         Thought Content: Thought content normal.         Judgment: Judgment normal.          Office Visit on 03/25/2023   Component Date Value Ref Range Status    SARS Coronavirus 2 Antigen 03/25/2023 Negative  Negative Final    Rapid Influenza A Ag 03/25/2023 Negative  Negative Final    Rapid Influenza B Ag 03/25/2023 Negative  Negative Final     Acceptable 03/25/2023 Yes   Final    Rapid Strep A Screen 03/25/2023 Positive (A)  Negative Final     Acceptable 03/25/2023 Yes   Final      Assessment:       1. Strep throat    2. Viral disease exposure    3. Sore throat (viral)    4. Sinusitis, unspecified chronicity, unspecified location    5. Allergic conjunctivitis, unspecified laterality    6. Cough, unspecified type          Plan:       Strep throat  -     cefUROXime (CEFTIN) 500 MG tablet; Take 1 tablet (500 mg total) by mouth 2 (two) times a day. for 10 days  Dispense: 20 tablet; Refill: 0    Viral disease exposure  -     POCT SARS-COV2 (COVID) with Flu Antigen    Sore throat (viral)  -     POCT rapid strep A    Sinusitis, unspecified chronicity,  unspecified location  -     cefUROXime (CEFTIN) 500 MG tablet; Take 1 tablet (500 mg total) by mouth 2 (two) times a day. for 10 days  Dispense: 20 tablet; Refill: 0  -     dexAMETHasone injection 6 mg    Allergic conjunctivitis, unspecified laterality  -     olopatadine (PATANOL) 0.1 % ophthalmic solution; Place 1 drop into both eyes 2 (two) times daily.  Dispense: 5 mL; Refill: 0  -     dexAMETHasone injection 6 mg    Cough, unspecified type  -     promethazine-dextromethorphan (PROMETHAZINE-DM) 6.25-15 mg/5 mL Syrp; Take 5 mLs by mouth every 6 (six) hours as needed (cough).  Dispense: 150 mL; Refill: 0  -     dexAMETHasone injection 6 mg    Drink plenty of fluids  RTC PRN          Risks, benefits, and side effects were discussed with the patient. All questions were answered to the fullest satisfaction of the patient, and pt verbalized understanding and agreement to treatment plan. Pt was to call with any new or worsening symptoms, or present to the ER.

## 2023-08-08 ENCOUNTER — HOSPITAL ENCOUNTER (OUTPATIENT)
Dept: RADIOLOGY | Facility: HOSPITAL | Age: 33
Discharge: HOME | End: 2023-08-08
Attending: SURGERY
Payer: MEDICAID

## 2023-08-08 ENCOUNTER — OFFICE VISIT (OUTPATIENT)
Dept: SURGERY | Facility: CLINIC | Age: 33
End: 2023-08-08
Attending: SURGERY
Payer: MEDICAID

## 2023-08-08 DIAGNOSIS — E04.1 THYROID NODULE: ICD-10-CM

## 2023-08-08 DIAGNOSIS — E04.1 THYROID NODULE: Primary | ICD-10-CM

## 2023-08-08 PROCEDURE — 76536 US THYROID: ICD-10-PCS | Mod: 26,,, | Performed by: RADIOLOGY

## 2023-08-08 PROCEDURE — 76536 US EXAM OF HEAD AND NECK: CPT | Mod: 26,,, | Performed by: RADIOLOGY

## 2023-08-08 PROCEDURE — 1159F MED LIST DOCD IN RCRD: CPT | Mod: CPTII,,, | Performed by: SURGERY

## 2023-08-08 PROCEDURE — 99213 OFFICE O/P EST LOW 20 MIN: CPT | Mod: PBBFAC | Performed by: SURGERY

## 2023-08-08 PROCEDURE — 1159F PR MEDICATION LIST DOCUMENTED IN MEDICAL RECORD: ICD-10-PCS | Mod: CPTII,,, | Performed by: SURGERY

## 2023-08-08 PROCEDURE — 76536 US EXAM OF HEAD AND NECK: CPT | Mod: TC

## 2023-08-08 PROCEDURE — 99213 OFFICE O/P EST LOW 20 MIN: CPT | Mod: S$PBB,,, | Performed by: SURGERY

## 2023-08-08 PROCEDURE — 99213 PR OFFICE/OUTPT VISIT, EST, LEVL III, 20-29 MIN: ICD-10-PCS | Mod: S$PBB,,, | Performed by: SURGERY

## 2023-08-08 NOTE — PROGRESS NOTES
General Surgery Progress Note    Subjective:      Patient ID: Namita Morris is a 32 y.o. female.    Chief Complaint: Thyroid Problem (6 mth f/u )      HPI:  32-year-old female here for follow-up for six-month FNA of a left lower thyroid nodule.  Patient is complaining of little bit more discomfort and pain in her neck.  Upper part.  Cut worse with swallowing.  Town dysphagia no shortness of breath.  Ultrasound performed today showed some increased vascularity possible thyroiditis.  Nodule similar in size.    History reviewed. No pertinent past medical history.  History reviewed. No pertinent surgical history.  Social History     Socioeconomic History    Marital status: Single   Tobacco Use    Smoking status: Every Day     Current packs/day: 0.00     Types: Cigarettes     Passive exposure: Never    Smokeless tobacco: Never   Substance and Sexual Activity    Alcohol use: Never    Drug use: Never    Sexual activity: Yes         Current Outpatient Medications:     olopatadine (PATANOL) 0.1 % ophthalmic solution, Place 1 drop into both eyes 2 (two) times daily., Disp: 5 mL, Rfl: 0  Review of patient's allergies indicates:  No Known Allergies    There were no vitals taken for this visit.    Review of Systems   Constitutional: Negative for chills, fever, weight gain and weight loss.   Eyes:  Negative for blurred vision.   Cardiovascular:  Negative for chest pain, claudication and palpitations.   Respiratory:  Negative for cough and shortness of breath.    Musculoskeletal:  Negative for muscle weakness.   Gastrointestinal:  Negative for abdominal pain, diarrhea, nausea and vomiting.   Neurological:  Negative for numbness and paresthesias.         Objective:     Constitutional: Well, No apparent distress  Mental Status: Alert and oriented  x 3  Eyes: Normal sclera, normal eyelid  Neck: Trachea midline, no masses on neck exam  Respiratory: Clear to  auscultation bilaterally with no wheezes/crackles/cough  Cardiac: Regular rate and rhythm, no murmur, rub, or gallops  Abdominal: Soft, non-tender, non-distented  Hernia: No appreciated hernias  Musculoskeletal: 5/5 strength no weakess no decreased range of montion  Neurologic: Cranial nerves II - XII intact    Labs/ Imaging: CBC:   Lab Results   Component Value Date/Time    WBC 6.59 12/29/2022 02:10 PM    RBC 4.40 12/29/2022 02:10 PM    HGB 11.4 (L) 12/29/2022 02:10 PM    HCT 36.2 (L) 12/29/2022 02:10 PM     12/29/2022 02:10 PM    MCV 82.3 12/29/2022 02:10 PM    MCH 25.9 (L) 12/29/2022 02:10 PM    MCHC 31.5 (L) 12/29/2022 02:10 PM     BMP:   Lab Results   Component Value Date/Time     12/29/2022 02:10 PM    CO2 28 12/29/2022 02:10 PM    BUN 9 12/29/2022 02:10 PM    CREATININE 0.58 12/29/2022 02:10 PM    CALCIUM 9.0 12/29/2022 02:10 PM     CMP:   Lab Results   Component Value Date/Time     12/29/2022 02:10 PM    CALCIUM 9.0 12/29/2022 02:10 PM     12/29/2022 02:10 PM    K 4.2 12/29/2022 02:10 PM    CO2 28 12/29/2022 02:10 PM     12/29/2022 02:10 PM    BUN 9 12/29/2022 02:10 PM    CREATININE 0.58 12/29/2022 02:10 PM           Assessment:             1. Thyroid nodule          Plan:       Orders Placed This Encounter    US Thyroid    Ambulatory referral/consult to Endocrinology     No follow-ups on file.      Referral to endocrine for possible thyroiditis management.  She will come back in 1 year with a repeat ultrasound of her neck to assess for the nodule there.  All questions were answered

## 2023-10-18 ENCOUNTER — OFFICE VISIT (OUTPATIENT)
Dept: FAMILY MEDICINE | Facility: CLINIC | Age: 33
End: 2023-10-18
Payer: MEDICAID

## 2023-10-18 VITALS
HEIGHT: 64 IN | RESPIRATION RATE: 18 BRPM | SYSTOLIC BLOOD PRESSURE: 112 MMHG | TEMPERATURE: 98 F | BODY MASS INDEX: 26.44 KG/M2 | HEART RATE: 83 BPM | WEIGHT: 154.88 LBS | DIASTOLIC BLOOD PRESSURE: 64 MMHG | OXYGEN SATURATION: 100 %

## 2023-10-18 DIAGNOSIS — R82.998 URINE WBC INCREASED: ICD-10-CM

## 2023-10-18 DIAGNOSIS — N76.0 ACUTE VAGINITIS: Primary | ICD-10-CM

## 2023-10-18 DIAGNOSIS — N39.0 URINARY TRACT INFECTION WITHOUT HEMATURIA, SITE UNSPECIFIED: ICD-10-CM

## 2023-10-18 DIAGNOSIS — N89.8 VAGINAL IRRITATION: ICD-10-CM

## 2023-10-18 LAB
B-HCG UR QL: NEGATIVE
BILIRUB SERPL-MCNC: NEGATIVE MG/DL
BLOOD URINE, POC: NEGATIVE
CANDIDA SPECIES: NEGATIVE
COLOR, POC UA: YELLOW
CTP QC/QA: YES
GARDNERELLA: POSITIVE
GLUCOSE UR QL STRIP: NEGATIVE
KETONES UR QL STRIP: NEGATIVE
LEUKOCYTE ESTERASE URINE, POC: NORMAL
NITRITE, POC UA: NEGATIVE
PH, POC UA: 6
PROTEIN, POC: NEGATIVE
SPECIFIC GRAVITY, POC UA: 1.01
TRICHOMONAS: NEGATIVE
UROBILINOGEN, POC UA: 0.2

## 2023-10-18 PROCEDURE — 87510 BACTERIAL VAGINOSIS: ICD-10-PCS | Mod: ,,, | Performed by: CLINICAL MEDICAL LABORATORY

## 2023-10-18 PROCEDURE — 87077 CULTURE, URINE: ICD-10-PCS | Mod: ,,, | Performed by: CLINICAL MEDICAL LABORATORY

## 2023-10-18 PROCEDURE — 87660 TRICHOMONAS VAGIN DIR PROBE: CPT | Mod: ,,, | Performed by: CLINICAL MEDICAL LABORATORY

## 2023-10-18 PROCEDURE — 87186 CULTURE, URINE: ICD-10-PCS | Mod: ,,, | Performed by: CLINICAL MEDICAL LABORATORY

## 2023-10-18 PROCEDURE — 3008F BODY MASS INDEX DOCD: CPT | Mod: CPTII,,, | Performed by: NURSE PRACTITIONER

## 2023-10-18 PROCEDURE — 99214 OFFICE O/P EST MOD 30 MIN: CPT | Mod: ,,, | Performed by: NURSE PRACTITIONER

## 2023-10-18 PROCEDURE — 87086 CULTURE, URINE: ICD-10-PCS | Mod: ,,, | Performed by: CLINICAL MEDICAL LABORATORY

## 2023-10-18 PROCEDURE — 1159F PR MEDICATION LIST DOCUMENTED IN MEDICAL RECORD: ICD-10-PCS | Mod: CPTII,,, | Performed by: NURSE PRACTITIONER

## 2023-10-18 PROCEDURE — 87086 URINE CULTURE/COLONY COUNT: CPT | Mod: ,,, | Performed by: CLINICAL MEDICAL LABORATORY

## 2023-10-18 PROCEDURE — 81025 URINE PREGNANCY TEST: CPT | Mod: RHCUB | Performed by: NURSE PRACTITIONER

## 2023-10-18 PROCEDURE — 87510 GARDNER VAG DNA DIR PROBE: CPT | Mod: ,,, | Performed by: CLINICAL MEDICAL LABORATORY

## 2023-10-18 PROCEDURE — 81003 URINALYSIS AUTO W/O SCOPE: CPT | Mod: RHCUB | Performed by: NURSE PRACTITIONER

## 2023-10-18 PROCEDURE — 87480 CANDIDA DNA DIR PROBE: CPT | Mod: ,,, | Performed by: CLINICAL MEDICAL LABORATORY

## 2023-10-18 PROCEDURE — 3078F PR MOST RECENT DIASTOLIC BLOOD PRESSURE < 80 MM HG: ICD-10-PCS | Mod: CPTII,,, | Performed by: NURSE PRACTITIONER

## 2023-10-18 PROCEDURE — 3078F DIAST BP <80 MM HG: CPT | Mod: CPTII,,, | Performed by: NURSE PRACTITIONER

## 2023-10-18 PROCEDURE — 87186 SC STD MICRODIL/AGAR DIL: CPT | Mod: ,,, | Performed by: CLINICAL MEDICAL LABORATORY

## 2023-10-18 PROCEDURE — 1159F MED LIST DOCD IN RCRD: CPT | Mod: CPTII,,, | Performed by: NURSE PRACTITIONER

## 2023-10-18 PROCEDURE — 3074F SYST BP LT 130 MM HG: CPT | Mod: CPTII,,, | Performed by: NURSE PRACTITIONER

## 2023-10-18 PROCEDURE — 99214 PR OFFICE/OUTPT VISIT, EST, LEVL IV, 30-39 MIN: ICD-10-PCS | Mod: ,,, | Performed by: NURSE PRACTITIONER

## 2023-10-18 PROCEDURE — 3008F PR BODY MASS INDEX (BMI) DOCUMENTED: ICD-10-PCS | Mod: CPTII,,, | Performed by: NURSE PRACTITIONER

## 2023-10-18 PROCEDURE — 3074F PR MOST RECENT SYSTOLIC BLOOD PRESSURE < 130 MM HG: ICD-10-PCS | Mod: CPTII,,, | Performed by: NURSE PRACTITIONER

## 2023-10-18 PROCEDURE — 87660 BACTERIAL VAGINOSIS: ICD-10-PCS | Mod: ,,, | Performed by: CLINICAL MEDICAL LABORATORY

## 2023-10-18 PROCEDURE — 87480 BACTERIAL VAGINOSIS: ICD-10-PCS | Mod: ,,, | Performed by: CLINICAL MEDICAL LABORATORY

## 2023-10-18 PROCEDURE — 87077 CULTURE AEROBIC IDENTIFY: CPT | Mod: ,,, | Performed by: CLINICAL MEDICAL LABORATORY

## 2023-10-18 RX ORDER — METRONIDAZOLE 500 MG/1
500 TABLET ORAL EVERY 12 HOURS
Qty: 14 TABLET | Refills: 0 | Status: SHIPPED | OUTPATIENT
Start: 2023-10-18 | End: 2023-10-25

## 2023-10-18 RX ORDER — FLUCONAZOLE 150 MG/1
150 TABLET ORAL
Qty: 2 TABLET | Refills: 0 | Status: SHIPPED | OUTPATIENT
Start: 2023-10-18 | End: 2023-10-26

## 2023-10-18 NOTE — PROGRESS NOTES
Subjective:       Patient ID: Namita Morris is a 32 y.o. female.    Chief Complaint: Vaginal Itching (X2 Days and irritation)    Vaginal itching and irritation    Review of Systems   Constitutional:  Negative for chills, fatigue and fever.   Genitourinary:  Positive for vaginal discharge and vaginal pain. Negative for dysuria, flank pain, frequency, genital sores and urgency.         Objective:      Physical Exam  Constitutional:       General: She is not in acute distress.     Appearance: Normal appearance. She is not ill-appearing, toxic-appearing or diaphoretic.   Eyes:      Pupils: Pupils are equal, round, and reactive to light.   Cardiovascular:      Rate and Rhythm: Normal rate and regular rhythm.      Pulses: Normal pulses.      Heart sounds: Normal heart sounds.   Pulmonary:      Effort: Pulmonary effort is normal.      Breath sounds: Normal breath sounds. No wheezing, rhonchi or rales.   Abdominal:      General: Bowel sounds are normal.      Palpations: Abdomen is soft.      Tenderness: There is no abdominal tenderness. There is no right CVA tenderness, left CVA tenderness or guarding.      Hernia: A hernia is present.   Genitourinary:     Vagina: Vaginal discharge present.   Musculoskeletal:         General: Normal range of motion.   Skin:     General: Skin is warm and dry.      Findings: No lesion.   Neurological:      Mental Status: She is alert and oriented to person, place, and time.   Psychiatric:         Mood and Affect: Mood normal.         Behavior: Behavior normal.         Thought Content: Thought content normal.         Judgment: Judgment normal.            Assessment:       1. Acute vaginitis    2. Vaginal irritation    3. Urine WBC increased        Plan:   Acute vaginitis    Vaginal irritation  -     POCT urine pregnancy  -     Bacterial Vaginosis; Future; Expected date: 10/18/2023  -     POCT URINALYSIS W/O SCOPE  -     fluconazole (DIFLUCAN) 150 MG Tab; Take 1 tablet (150 mg total) by  mouth every 7 days. for 2 doses  Dispense: 2 tablet; Refill: 0  -     metroNIDAZOLE (FLAGYL) 500 MG tablet; Take 1 tablet (500 mg total) by mouth every 12 (twelve) hours. for 14 doses  Dispense: 14 tablet; Refill: 0    Urine WBC increased         Risks, benefits, and side effects were discussed with the patient. All questions were answered to the fullest satisfaction of the patient, and pt verbalized understanding and agreement to treatment plan. Pt was to call with any new or worsening symptoms, or present to the ER

## 2023-10-21 LAB — UA COMPLETE W REFLEX CULTURE PNL UR: ABNORMAL

## 2023-10-22 RX ORDER — NITROFURANTOIN 25; 75 MG/1; MG/1
100 CAPSULE ORAL 2 TIMES DAILY
Qty: 14 CAPSULE | Refills: 0 | Status: SHIPPED | OUTPATIENT
Start: 2023-10-22

## 2023-10-22 NOTE — PROGRESS NOTES
I tried to call pt on all 3 numbers listed- the first two stated not accepting phone calls and the last one I left a voicemail to return call

## 2024-06-26 DIAGNOSIS — E04.1 THYROID NODULE: Primary | ICD-10-CM

## 2024-08-20 ENCOUNTER — HOSPITAL ENCOUNTER (OUTPATIENT)
Dept: RADIOLOGY | Facility: HOSPITAL | Age: 34
Discharge: HOME | End: 2024-08-20
Attending: SURGERY
Payer: MEDICAID

## 2024-08-20 ENCOUNTER — OFFICE VISIT (OUTPATIENT)
Dept: SURGERY | Facility: CLINIC | Age: 34
End: 2024-08-20
Attending: SURGERY
Payer: MEDICAID

## 2024-08-20 VITALS — RESPIRATION RATE: 16 BRPM

## 2024-08-20 DIAGNOSIS — J02.9 SORE THROAT: ICD-10-CM

## 2024-08-20 DIAGNOSIS — E04.1 THYROID NODULE: ICD-10-CM

## 2024-08-20 DIAGNOSIS — E04.1 THYROID NODULE: Primary | ICD-10-CM

## 2024-08-20 PROCEDURE — 76536 US EXAM OF HEAD AND NECK: CPT | Mod: 26,,, | Performed by: RADIOLOGY

## 2024-08-20 PROCEDURE — 99999 PR PBB SHADOW E&M-EST. PATIENT-LVL III: CPT | Mod: PBBFAC,,, | Performed by: SURGERY

## 2024-08-20 PROCEDURE — 99213 OFFICE O/P EST LOW 20 MIN: CPT | Mod: S$PBB,,, | Performed by: SURGERY

## 2024-08-20 PROCEDURE — 99213 OFFICE O/P EST LOW 20 MIN: CPT | Mod: PBBFAC,25 | Performed by: SURGERY

## 2024-08-20 PROCEDURE — 1159F MED LIST DOCD IN RCRD: CPT | Mod: CPTII,,, | Performed by: SURGERY

## 2024-08-20 PROCEDURE — 76536 US EXAM OF HEAD AND NECK: CPT | Mod: TC

## 2024-08-20 NOTE — PROGRESS NOTES
General Surgery Progress Note    Subjective:      Patient ID: Namita Morris is a 33 y.o. female.    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  She has been doing okay.  Having some persistent sore throat.  She states about once a week she gets it and has been going on for the last year and a half.  No nausea no vomiting no choking no pain no dysphagia.    History reviewed. No pertinent past medical history.  History reviewed. No pertinent surgical history.  Social History     Socioeconomic History    Marital status: Single   Tobacco Use    Smoking status: Every Day     Types: Cigarettes     Passive exposure: Never    Smokeless tobacco: Never   Substance and Sexual Activity    Alcohol use: Never    Drug use: Never    Sexual activity: Yes     Social Determinants of Health     Financial Resource Strain: Patient Declined (5/31/2024)    Overall Financial Resource Strain (CARDIA)     Difficulty of Paying Living Expenses: Patient declined   Food Insecurity: Patient Declined (5/31/2024)    Hunger Vital Sign     Worried About Running Out of Food in the Last Year: Patient declined     Ran Out of Food in the Last Year: Patient declined   Physical Activity: Unknown (5/31/2024)    Exercise Vital Sign     Days of Exercise per Week: 0 days   Stress: Stress Concern Present (5/31/2024)    [employer] - Occupational Stress Questionnaire     Feeling of Stress : Very much   Housing Stability: Unknown (5/31/2024)    Housing Stability Vital Sign     Unable to Pay for Housing in the Last Year: Patient declined         Current Outpatient Medications:     nitrofurantoin, macrocrystal-monohydrate, (MACROBID) 100 MG capsule, Take 1 capsule (100 mg total) by mouth 2 (two) times daily. (Patient not taking: Reported on 8/20/2024), Disp: 14 capsule, Rfl:  0  Review of patient's allergies indicates:  No Known Allergies    Resp 16     Review of Systems   Constitutional: Negative for chills, fever, weight gain and weight loss.   HENT:  Positive for sore throat.    Eyes:  Negative for blurred vision.   Cardiovascular:  Negative for chest pain, claudication and palpitations.   Respiratory:  Negative for cough and shortness of breath.    Musculoskeletal:  Negative for muscle weakness.   Gastrointestinal:  Negative for abdominal pain, diarrhea, nausea and vomiting.   Neurological:  Negative for numbness and paresthesias.         Objective:     Constitutional: Well, No apparent distress  Mental Status: Alert and oriented  x 3  Eyes: Normal sclera, normal eyelid  Neck: Trachea midline, no masses on neck exam  Respiratory: Clear to auscultation bilaterally with no wheezes/crackles/cough  Cardiac: Regular rate and rhythm, no murmur, rub, or gallops  Abdominal: Soft, non-tender, non-distented  Hernia: No appreciated hernias  Musculoskeletal: 5/5 strength no weakess no decreased range of montion  Neurologic: Cranial nerves II - XII intact    Labs/ Imaging: CBC:   Lab Results   Component Value Date/Time    WBC 6.59 12/29/2022 02:10 PM    RBC 4.40 12/29/2022 02:10 PM    HGB 11.4 (L) 12/29/2022 02:10 PM    HCT 36.2 (L) 12/29/2022 02:10 PM     12/29/2022 02:10 PM    MCV 82.3 12/29/2022 02:10 PM    MCH 25.9 (L) 12/29/2022 02:10 PM    MCHC 31.5 (L) 12/29/2022 02:10 PM     BMP:   Lab Results   Component Value Date/Time     12/29/2022 02:10 PM    CO2 28 12/29/2022 02:10 PM    BUN 9 12/29/2022 02:10 PM    CREATININE 0.58 12/29/2022 02:10 PM    CALCIUM 9.0 12/29/2022 02:10 PM           Assessment:             1. Thyroid nodule          Plan:       Orders Placed This Encounter    US Thyroid     No follow-ups on file.      She will come back in 1 year time with a repeat ultrasound.  ENT referral for the persistent sore throat.  He will come back sooner for any worsening  choking, neck pain, swelling, or any other concerning issues.

## 2024-08-21 ENCOUNTER — OFFICE VISIT (OUTPATIENT)
Dept: FAMILY MEDICINE | Facility: CLINIC | Age: 34
End: 2024-08-21
Payer: MEDICAID

## 2024-08-21 VITALS
OXYGEN SATURATION: 100 % | TEMPERATURE: 98 F | WEIGHT: 172 LBS | SYSTOLIC BLOOD PRESSURE: 116 MMHG | HEART RATE: 89 BPM | HEIGHT: 64 IN | DIASTOLIC BLOOD PRESSURE: 80 MMHG | BODY MASS INDEX: 29.37 KG/M2 | RESPIRATION RATE: 18 BRPM

## 2024-08-21 DIAGNOSIS — J02.9 ACUTE PHARYNGITIS, UNSPECIFIED ETIOLOGY: Primary | ICD-10-CM

## 2024-08-21 DIAGNOSIS — Z20.828 EXPOSURE TO VIRAL DISEASE: ICD-10-CM

## 2024-08-21 DIAGNOSIS — Z20.818 EXPOSURE TO STREP THROAT: ICD-10-CM

## 2024-08-21 DIAGNOSIS — R05.9 COUGH, UNSPECIFIED TYPE: ICD-10-CM

## 2024-08-21 LAB
CTP QC/QA: YES
MOLECULAR STREP A: NEGATIVE
POC MOLECULAR INFLUENZA A AGN: NEGATIVE
POC MOLECULAR INFLUENZA B AGN: NEGATIVE
SARS-COV-2 RDRP RESP QL NAA+PROBE: NEGATIVE

## 2024-08-21 PROCEDURE — 3074F SYST BP LT 130 MM HG: CPT | Mod: CPTII,,, | Performed by: NURSE PRACTITIONER

## 2024-08-21 PROCEDURE — 87651 STREP A DNA AMP PROBE: CPT | Mod: RHCUB | Performed by: NURSE PRACTITIONER

## 2024-08-21 PROCEDURE — 3079F DIAST BP 80-89 MM HG: CPT | Mod: CPTII,,, | Performed by: NURSE PRACTITIONER

## 2024-08-21 PROCEDURE — 87502 INFLUENZA DNA AMP PROBE: CPT | Mod: RHCUB | Performed by: NURSE PRACTITIONER

## 2024-08-21 PROCEDURE — 3008F BODY MASS INDEX DOCD: CPT | Mod: CPTII,,, | Performed by: NURSE PRACTITIONER

## 2024-08-21 PROCEDURE — 1159F MED LIST DOCD IN RCRD: CPT | Mod: CPTII,,, | Performed by: NURSE PRACTITIONER

## 2024-08-21 PROCEDURE — 99214 OFFICE O/P EST MOD 30 MIN: CPT | Mod: ,,, | Performed by: NURSE PRACTITIONER

## 2024-08-21 PROCEDURE — 87635 SARS-COV-2 COVID-19 AMP PRB: CPT | Mod: RHCUB | Performed by: NURSE PRACTITIONER

## 2024-08-21 RX ORDER — AMOXICILLIN 500 MG/1
500 CAPSULE ORAL EVERY 12 HOURS
Qty: 20 CAPSULE | Refills: 0 | Status: SHIPPED | OUTPATIENT
Start: 2024-08-21 | End: 2024-08-31

## 2024-08-21 NOTE — PROGRESS NOTES
Subjective:       Patient ID: Namita Morris is a 33 y.o. female.    Chief Complaint: Sore Throat, Nasal Congestion, Chest Pain, Cough, and Generalized Body Aches    Sore Throat, Nasal Congestion, Chest Pain, Cough, and Generalized Body Aches (Pt. States- son has strep throat but her symptoms started later than his)      Sore Throat   Associated symptoms include congestion and coughing. Pertinent negatives include no abdominal pain, ear pain, headaches, neck pain, shortness of breath or vomiting.   Chest Pain   Associated symptoms include a cough. Pertinent negatives include no abdominal pain, back pain, dizziness, fever, headaches, nausea, shortness of breath, vomiting or weakness.   Cough  Associated symptoms include myalgias and a sore throat. Pertinent negatives include no ear pain, fever, headaches, rash or shortness of breath.     Review of Systems   Constitutional:  Negative for appetite change, fatigue and fever.   HENT:  Positive for nasal congestion and sore throat. Negative for ear pain.    Eyes:  Negative for pain, discharge and itching.   Respiratory:  Positive for cough. Negative for shortness of breath.    Cardiovascular:  Negative for leg swelling.   Gastrointestinal:  Negative for abdominal pain, change in bowel habit, nausea and vomiting.   Musculoskeletal:  Positive for myalgias. Negative for back pain, gait problem and neck pain.   Integumentary:  Negative for rash and wound.   Allergic/Immunologic: Negative for immunocompromised state.   Neurological:  Negative for dizziness, weakness and headaches.   All other systems reviewed and are negative.        Objective:      Physical Exam  Vitals and nursing note reviewed.   Constitutional:       General: She is not in acute distress.     Appearance: Normal appearance. She is not ill-appearing, toxic-appearing or diaphoretic.   HENT:      Head: Normocephalic.      Right Ear: Tympanic membrane, ear canal and external ear normal.      Left Ear:  Tympanic membrane, ear canal and external ear normal.      Nose: Congestion present. No rhinorrhea.      Mouth/Throat:      Mouth: Mucous membranes are moist.      Pharynx: Posterior oropharyngeal erythema present. No oropharyngeal exudate.   Eyes:      General: No scleral icterus.        Right eye: No discharge.         Left eye: No discharge.      Extraocular Movements: Extraocular movements intact.      Conjunctiva/sclera: Conjunctivae normal.      Pupils: Pupils are equal, round, and reactive to light.   Cardiovascular:      Rate and Rhythm: Normal rate and regular rhythm.      Pulses: Normal pulses.      Heart sounds: Normal heart sounds. No murmur heard.  Pulmonary:      Effort: Pulmonary effort is normal. No respiratory distress.      Breath sounds: Normal breath sounds. No wheezing, rhonchi or rales.   Musculoskeletal:         General: Normal range of motion.      Cervical back: Neck supple. Tenderness present.   Lymphadenopathy:      Cervical: Cervical adenopathy present.   Skin:     General: Skin is warm and dry.      Capillary Refill: Capillary refill takes less than 2 seconds.      Findings: No rash.   Neurological:      Mental Status: She is alert and oriented to person, place, and time.   Psychiatric:         Mood and Affect: Mood normal.         Behavior: Behavior normal.         Thought Content: Thought content normal.         Judgment: Judgment normal.            Assessment:       1. Acute pharyngitis, unspecified etiology    2. Exposure to viral disease    3. Cough, unspecified type    4. Exposure to strep throat        Plan:   Acute pharyngitis, unspecified etiology  -     amoxicillin (AMOXIL) 500 MG capsule; Take 1 capsule (500 mg total) by mouth every 12 (twelve) hours. for 10 days  Dispense: 20 capsule; Refill: 0    Exposure to viral disease  -     POCT COVID-19 Rapid Screening  -     POCT Strep A, Molecular    Cough, unspecified type  -     POCT Influenza A/B Molecular    Exposure to strep  throat  -     amoxicillin (AMOXIL) 500 MG capsule; Take 1 capsule (500 mg total) by mouth every 12 (twelve) hours. for 10 days  Dispense: 20 capsule; Refill: 0           Risks, benefits, and side effects were discussed with the patient. All questions were answered to the fullest satisfaction of the patient, and pt verbalized understanding and agreement to treatment plan. Pt was to call with any new or worsening symptoms, or present to the ER

## 2024-08-21 NOTE — LETTER
August 21, 2024      [facility]  [address] [place] [address]  Phone: [phone]  Fax: [phone]       Patient: Namita Morris   YOB: 1990  Date of Visit: 08/21/2024    To Whom It May Concern:    Susy Morris  was at [facility] on 08/21/2024. The patient may return to work/school on 8/23/24 with no restrictions. If you have any questions or concerns, or if I can be of further assistance, please do not hesitate to contact me.    Sincerely,    RONALD Bryan

## 2024-08-23 ENCOUNTER — OFFICE VISIT (OUTPATIENT)
Dept: OTOLARYNGOLOGY | Facility: CLINIC | Age: 34
End: 2024-08-23
Attending: SURGERY
Payer: MEDICAID

## 2024-08-23 VITALS — WEIGHT: 156 LBS | BODY MASS INDEX: 26.63 KG/M2 | HEIGHT: 64 IN

## 2024-08-23 DIAGNOSIS — J02.9 PHARYNGITIS, UNSPECIFIED ETIOLOGY: Primary | ICD-10-CM

## 2024-08-23 DIAGNOSIS — J02.9 SORE THROAT: ICD-10-CM

## 2024-08-23 PROCEDURE — 99204 OFFICE O/P NEW MOD 45 MIN: CPT | Mod: S$PBB,,, | Performed by: OTOLARYNGOLOGY

## 2024-08-23 PROCEDURE — 1160F RVW MEDS BY RX/DR IN RCRD: CPT | Mod: CPTII,,, | Performed by: OTOLARYNGOLOGY

## 2024-08-23 PROCEDURE — 99213 OFFICE O/P EST LOW 20 MIN: CPT | Mod: PBBFAC | Performed by: OTOLARYNGOLOGY

## 2024-08-23 PROCEDURE — 1159F MED LIST DOCD IN RCRD: CPT | Mod: CPTII,,, | Performed by: OTOLARYNGOLOGY

## 2024-08-23 PROCEDURE — 3008F BODY MASS INDEX DOCD: CPT | Mod: CPTII,,, | Performed by: OTOLARYNGOLOGY

## 2024-08-23 PROCEDURE — 99999 PR PBB SHADOW E&M-EST. PATIENT-LVL III: CPT | Mod: PBBFAC,,, | Performed by: OTOLARYNGOLOGY

## 2024-08-23 RX ORDER — CLINDAMYCIN HYDROCHLORIDE 300 MG/1
300 CAPSULE ORAL 2 TIMES DAILY
Qty: 28 CAPSULE | Refills: 0 | Status: SHIPPED | OUTPATIENT
Start: 2024-08-23

## 2024-08-23 RX ORDER — METHYLPREDNISOLONE 4 MG/1
TABLET ORAL
Qty: 1 EACH | Refills: 0 | Status: SHIPPED | OUTPATIENT
Start: 2024-08-23

## 2024-08-23 NOTE — PROGRESS NOTES
Subjective:       Patient ID: Namita Morris is a 33 y.o. female.    Chief Complaint: Sore Throat (Sore throat. Pt states her throat is sore every other week x 1 year, worse in the mornings with dry, sore throat. Currently taken po PCN. )    Sore Throat   Associated symptoms include trouble swallowing.     Review of Systems   HENT:  Positive for sore throat and trouble swallowing.    All other systems reviewed and are negative.      Objective:      Physical Exam  General: NAD  Head: Normocephalic, atraumatic, no facial asymmetry/normal strength,  Ears: Both auricules normal in appearance, w/o deformities tympanic membranes normal external auditory canals normal  Nose: External nose w/o deformities normal turbinates no drainage or inflammation  Oral Cavity: Lips, gums, floor of mouth, tongue hard palate, and buccal mucosa without mass/lesion Tonsils ok Pharynx red   Oropharynx: Mucosa pink and moist, soft palate, posterior pharynx and oropharyngeal wall without mass/lesion  Neck: Supple, symmetric, trachea midline, no palpable mass/lesion, no palpable cervical lymphadenopathy  Skin: Warm and dry, no concerning lesions  Respiratory: Respirations even, unlabored  Assessment:       1. Pharyngitis, unspecified etiology    2. Sore throat        Plan:       Medrol cleocin   F/u 2 weeks

## 2024-10-15 ENCOUNTER — OFFICE VISIT (OUTPATIENT)
Facility: CLINIC | Age: 34
End: 2024-10-15
Payer: MEDICAID

## 2024-10-15 VITALS
RESPIRATION RATE: 18 BRPM | DIASTOLIC BLOOD PRESSURE: 75 MMHG | WEIGHT: 174 LBS | HEART RATE: 82 BPM | TEMPERATURE: 98 F | HEIGHT: 64 IN | BODY MASS INDEX: 29.71 KG/M2 | SYSTOLIC BLOOD PRESSURE: 112 MMHG | OXYGEN SATURATION: 99 %

## 2024-10-15 DIAGNOSIS — E66.3 OVERWEIGHT WITH BODY MASS INDEX (BMI) OF 29 TO 29.9 IN ADULT: ICD-10-CM

## 2024-10-15 DIAGNOSIS — Z72.51 HIGH RISK HETEROSEXUAL BEHAVIOR: ICD-10-CM

## 2024-10-15 DIAGNOSIS — N89.8 VAGINA ITCHING: Primary | ICD-10-CM

## 2024-10-15 DIAGNOSIS — N90.7 INCLUSION CYST OF VULVA: ICD-10-CM

## 2024-10-15 DIAGNOSIS — Z11.3 ENCOUNTER FOR SPECIAL SCREENING EXAMINATION FOR INFECTION WITH PREDOMINANTLY SEXUAL MODE OF TRANSMISSION: ICD-10-CM

## 2024-10-15 LAB
CANDIDA SPECIES: NEGATIVE
GARDNERELLA: POSITIVE
TRICHOMONAS: NEGATIVE

## 2024-10-15 PROCEDURE — 3078F DIAST BP <80 MM HG: CPT | Mod: CPTII,,, | Performed by: ADVANCED PRACTICE MIDWIFE

## 2024-10-15 PROCEDURE — 87480 CANDIDA DNA DIR PROBE: CPT | Mod: ,,, | Performed by: CLINICAL MEDICAL LABORATORY

## 2024-10-15 PROCEDURE — 87510 GARDNER VAG DNA DIR PROBE: CPT | Mod: ,,, | Performed by: CLINICAL MEDICAL LABORATORY

## 2024-10-15 PROCEDURE — 3074F SYST BP LT 130 MM HG: CPT | Mod: CPTII,,, | Performed by: ADVANCED PRACTICE MIDWIFE

## 2024-10-15 PROCEDURE — 1159F MED LIST DOCD IN RCRD: CPT | Mod: CPTII,,, | Performed by: ADVANCED PRACTICE MIDWIFE

## 2024-10-15 PROCEDURE — 87591 N.GONORRHOEAE DNA AMP PROB: CPT | Mod: ,,, | Performed by: CLINICAL MEDICAL LABORATORY

## 2024-10-15 PROCEDURE — 87660 TRICHOMONAS VAGIN DIR PROBE: CPT | Mod: ,,, | Performed by: CLINICAL MEDICAL LABORATORY

## 2024-10-15 PROCEDURE — 87491 CHLMYD TRACH DNA AMP PROBE: CPT | Mod: ,,, | Performed by: CLINICAL MEDICAL LABORATORY

## 2024-10-15 PROCEDURE — 3008F BODY MASS INDEX DOCD: CPT | Mod: CPTII,,, | Performed by: ADVANCED PRACTICE MIDWIFE

## 2024-10-15 PROCEDURE — 99213 OFFICE O/P EST LOW 20 MIN: CPT | Mod: ,,, | Performed by: ADVANCED PRACTICE MIDWIFE

## 2024-10-15 NOTE — LETTER
October 15, 2024      [facility]  [address]  [place] [address]  Phone: [phone]  Fax: [phone]       Patient: Namita Morris   YOB: 1990  Date of Visit: 10/15/2024    To Whom It May Concern:    Susy Morris  was at [facility] on 10/15/2024. The patient may return to work/school on 10/16/2024 with no restrictions. If you have any questions or concerns, or if I can be of further assistance, please do not hesitate to contact me.    Sincerely,    Joaquin York LPN

## 2024-10-16 DIAGNOSIS — B96.89 BACTERIAL VAGINAL INFECTION: Primary | ICD-10-CM

## 2024-10-16 DIAGNOSIS — N76.0 BACTERIAL VAGINAL INFECTION: Primary | ICD-10-CM

## 2024-10-16 PROBLEM — N90.7 INCLUSION CYST OF VULVA: Status: ACTIVE | Noted: 2024-10-16

## 2024-10-16 LAB
CHLAMYDIA BY PCR: NEGATIVE
N. GONORRHOEAE (GC) BY PCR: NEGATIVE

## 2024-10-16 RX ORDER — FLUCONAZOLE 150 MG/1
150 TABLET ORAL
Qty: 2 TABLET | Refills: 0 | Status: SHIPPED | OUTPATIENT
Start: 2024-10-16 | End: 2024-10-24

## 2024-10-16 RX ORDER — METRONIDAZOLE 500 MG/1
500 TABLET ORAL 2 TIMES DAILY
Qty: 14 TABLET | Refills: 0 | Status: SHIPPED | OUTPATIENT
Start: 2024-10-16 | End: 2024-10-23

## 2024-10-17 NOTE — PROGRESS NOTES
"Patient ID:  Namita Morris is a 33 y.o. female      Chief Complaint:   Chief Complaint   Patient presents with    Vaginitis     X1 week  No discharge or odpr, but does state that she has a bump to her left vaginal lip.     Not Taking Birth Control  Sexually Active  Wants STD Testing   Last Cycle 10/04/2024  Last PAP Unknown         HPI:  Namita presents as a new patient with c/o vaginal itching  x 1 week. Denies vaginal discharge and odor, no abnormal vaginal bleeding. Reports having a  "bump" on left labia which appeared .   LMP: Patient's last menstrual period was 10/04/2024.  Monthly lasting 4 days, normal flow  Sexually active:  yes  Contraceptive: none      No past medical history on file.  No past surgical history on file.    OB History          5    Para   5    Term   5            AB        Living             SAB        IAB        Ectopic        Multiple        Live Births   5                 /75   Pulse 82   Temp 98.1 °F (36.7 °C) (Oral)   Resp 18   Ht 5' 4" (1.626 m)   Wt 78.9 kg (174 lb)   LMP 10/04/2024   SpO2 99%   BMI 29.87 kg/m²   Wt Readings from Last 3 Encounters:   10/15/24 78.9 kg (174 lb)   24 70.8 kg (156 lb)   24 78 kg (172 lb)          ROS:  Review of Systems   Constitutional: Negative.    Eyes: Negative.    Respiratory: Negative.     Cardiovascular: Negative.    Gastrointestinal: Negative.    Genitourinary:  Negative for vaginal discharge and vaginal odor.        Vaginal itching  "Bump" on left labia   Musculoskeletal: Negative.    Neurological: Negative.    Psychiatric/Behavioral: Negative.            PHYSICAL EXAM:  Physical Exam  Constitutional:       Appearance: Normal appearance. She is normal weight.   Eyes:      Extraocular Movements: Extraocular movements intact.   Cardiovascular:      Rate and Rhythm: Normal rate.      Pulses: Normal pulses.   Pulmonary:      Effort: Pulmonary effort is normal.   Genitourinary:     Exam " position: Lithotomy position.      Pubic Area: No rash.       Labia:         Right: No rash or tenderness.         Left: No rash or tenderness.       Urethra: No prolapse.       Musculoskeletal:         General: Normal range of motion.      Cervical back: Normal range of motion.   Skin:     General: Skin is warm and dry.   Neurological:      Mental Status: She is alert and oriented to person, place, and time.   Psychiatric:         Mood and Affect: Mood normal.         Behavior: Behavior normal.         Thought Content: Thought content normal.         Judgment: Judgment normal.        Assessment:  Namita was seen today for vaginitis.    Diagnoses and all orders for this visit:    Vagina itching  -     Bacterial Vaginosis  -     Chlamydia/GC, PCR    High risk heterosexual behavior  -     Bacterial Vaginosis  -     Chlamydia/GC, PCR    Encounter for special screening examination for infection with predominantly sexual mode of transmission  -     Bacterial Vaginosis  -     Chlamydia/GC, PCR    Overweight with body mass index (BMI) of 29 to 29.9 in adult    Inclusion cyst of vulva          ICD-10-CM ICD-9-CM    1. Vagina itching  N89.8 698.1 Bacterial Vaginosis      Chlamydia/GC, PCR      2. High risk heterosexual behavior  Z72.51 V69.2 Bacterial Vaginosis      Chlamydia/GC, PCR      3. Encounter for special screening examination for infection with predominantly sexual mode of transmission  Z11.3 V74.5 Bacterial Vaginosis      Chlamydia/GC, PCR      4. Overweight with body mass index (BMI) of 29 to 29.9 in adult  E66.3 278.02     Z68.29 V85.25       5. Inclusion cyst of vulva  N90.7 624.8           Plan:  Affirm swab self collected  Urine send for GC/CT testing  Will call or send My Chart message after results reviewed  Encouraged use of condoms with each sexual encounter to decrease STD exposure risk  Discussed inclusion cyst, no treatment needed unless s/s infection develop    Follow up for care as needed.

## 2024-12-13 ENCOUNTER — HOSPITAL ENCOUNTER (EMERGENCY)
Facility: HOSPITAL | Age: 34
Discharge: HOME | End: 2024-12-13
Payer: MEDICAID

## 2024-12-13 VITALS
DIASTOLIC BLOOD PRESSURE: 71 MMHG | WEIGHT: 177 LBS | SYSTOLIC BLOOD PRESSURE: 118 MMHG | TEMPERATURE: 100 F | RESPIRATION RATE: 17 BRPM | HEIGHT: 64 IN | HEART RATE: 115 BPM | OXYGEN SATURATION: 100 % | BODY MASS INDEX: 30.22 KG/M2

## 2024-12-13 DIAGNOSIS — U07.1 COVID-19: Primary | ICD-10-CM

## 2024-12-13 LAB
GROUP A STREP MOLECULAR (OHS): NEGATIVE
INFLUENZA A MOLECULAR (OHS): NEGATIVE
INFLUENZA B MOLECULAR (OHS): NEGATIVE
SARS-COV-2 RDRP RESP QL NAA+PROBE: POSITIVE

## 2024-12-13 PROCEDURE — 87635 SARS-COV-2 COVID-19 AMP PRB: CPT | Performed by: NURSE PRACTITIONER

## 2024-12-13 PROCEDURE — 87502 INFLUENZA DNA AMP PROBE: CPT | Performed by: NURSE PRACTITIONER

## 2024-12-13 PROCEDURE — 87651 STREP A DNA AMP PROBE: CPT | Performed by: NURSE PRACTITIONER

## 2024-12-13 PROCEDURE — 99283 EMERGENCY DEPT VISIT LOW MDM: CPT

## 2024-12-13 RX ORDER — PROMETHAZINE HYDROCHLORIDE 25 MG/1
25 TABLET ORAL EVERY 6 HOURS PRN
Qty: 15 TABLET | Refills: 0 | Status: SHIPPED | OUTPATIENT
Start: 2024-12-13

## 2024-12-13 RX ORDER — IBUPROFEN 800 MG/1
800 TABLET ORAL EVERY 6 HOURS PRN
Qty: 20 TABLET | Refills: 0 | Status: SHIPPED | OUTPATIENT
Start: 2024-12-13

## 2024-12-13 RX ORDER — GUAIFENESIN AND DEXTROMETHORPHAN HYDROBROMIDE 1200; 60 MG/1; MG/1
1 TABLET, EXTENDED RELEASE ORAL 2 TIMES DAILY
Qty: 20 TABLET | Refills: 0 | Status: SHIPPED | OUTPATIENT
Start: 2024-12-13 | End: 2024-12-23

## 2024-12-13 NOTE — Clinical Note
"[facility]Da[facility] was seen and treated in our emergency department on 12/13/2024.     COVID-19 is present in our communities across the state. There is limited testing for COVID at this time, so not all patients can be tested. In this situation, your employee meets the following criteria:    Namita Morris has met the criteria for COVID-19 testing and has a POSITIVE result. She can return to work once they are asymptomatic for 24 hours without the use of fever reducing medications AND at least five days from the first positive result. A mask is recommended for 5 days post quarantine.     If you have any questions or concerns, or if I can be of further assistance, please do not hesitate to contact me.    Sincerely,             Estefany Villeda FNP"

## 2024-12-13 NOTE — ED PROVIDER NOTES
Encounter Date: 2024       History     Chief Complaint   Patient presents with    Sore Throat    Chills     Pt presents to ED via POV with c/o sore throat and chills that started Wednesday.     34 year old female presents to ED with URI symptoms. Symptoms started on Wednesday to include chills, headache, sore throat, and cough. Patient reports nonproductive cough and body aches as well. Denies chest pain, shortness of breath, nausea/vomiting, diarrhea.     The history is provided by the patient.     Review of patient's allergies indicates:  No Known Allergies  History reviewed. No pertinent past medical history.  Past Surgical History:   Procedure Laterality Date    BILATERAL TUBAL LIGATION       SECTION      x 5    throat cyst      age 6     No family history on file.  Social History     Tobacco Use    Smoking status: Every Day     Types: Cigarettes     Passive exposure: Never    Smokeless tobacco: Never   Substance Use Topics    Alcohol use: Never    Drug use: Never     Review of Systems   Constitutional:  Positive for chills. Negative for fever.   HENT:  Positive for congestion and sore throat.    Respiratory:  Positive for cough. Negative for shortness of breath.    Cardiovascular:  Negative for chest pain and palpitations.   Gastrointestinal:  Negative for nausea and vomiting.   Genitourinary:  Negative for difficulty urinating and dysuria.   Neurological:  Positive for headaches. Negative for weakness.   Psychiatric/Behavioral:  Negative for agitation and confusion.    All other systems reviewed and are negative.      Physical Exam     Initial Vitals [24 1520]   BP Pulse Resp Temp SpO2   118/71 (!) 115 17 99.5 °F (37.5 °C) 100 %      MAP       --         Physical Exam    Nursing note and vitals reviewed.  Constitutional: She appears well-developed and well-nourished. She appears ill.   HENT:   Head: Normocephalic and atraumatic. Mouth/Throat: Posterior oropharyngeal erythema present.    Eyes: EOM are normal. Pupils are equal, round, and reactive to light.   Neck: Neck supple.   Normal range of motion.  Cardiovascular:  Normal rate and regular rhythm.           No murmur heard.  Pulmonary/Chest: She has no wheezes. She has no rhonchi.   Abdominal: She exhibits no distension. There is no abdominal tenderness.   Musculoskeletal:         General: No tenderness or edema.      Cervical back: Normal range of motion and neck supple.     Lymphadenopathy:     She has no cervical adenopathy.   Neurological: She is alert. No cranial nerve deficit or sensory deficit.   Skin: Skin is warm and dry. Capillary refill takes less than 2 seconds.   Psychiatric: She has a normal mood and affect. Thought content normal.         Medical Screening Exam   See Full Note    ED Course   Procedures  Labs Reviewed   SARS-COV-2 RNA AMPLIFICATION, QUAL - Abnormal       Result Value    SARS COV-2 Molecular Positive (*)    INFLUENZA A & B BY MOLECULAR - Normal    INFLUENZA A MOLECULAR Negative      INFLUENZA B MOLECULAR  Negative     STREP A BY MOLECULAR METHOD - Normal    Group A Strep Molecular Negative            Imaging Results    None          Medications - No data to display  Medical Decision Making  34 year old female presents to ED with URI symptoms. Symptoms started on Wednesday to include chills, headache, sore throat, and cough. Patient reports nonproductive cough and body aches as well. Denies chest pain, shortness of breath, nausea/vomiting, diarrhea.     Labs ordered and reviewed by me  Prescriptions provided    Amount and/or Complexity of Data Reviewed  Labs: ordered.    Risk  OTC drugs.  Prescription drug management.                                      Clinical Impression:   Final diagnoses:  [U07.1] COVID-19 (Primary)        ED Disposition Condition    Discharge Stable          ED Prescriptions       Medication Sig Dispense Start Date End Date Auth. Provider    dextromethorphan-guaiFENesin (MUCINEX DM) 60-1,200  mg per 12 hr tablet Take 1 tablet by mouth 2 (two) times a day. for 10 days 20 tablet 12/13/2024 12/23/2024 Estefany Villeda FNP    promethazine (PHENERGAN) 25 MG tablet Take 1 tablet (25 mg total) by mouth every 6 (six) hours as needed for Nausea. 15 tablet 12/13/2024 -- Estefany Villeda FNP    ibuprofen (ADVIL,MOTRIN) 800 MG tablet Take 1 tablet (800 mg total) by mouth every 6 (six) hours as needed for Pain. 20 tablet 12/13/2024 -- Estefany Villeda FNP          Follow-up Information    None          Estefany Villeda FNP  12/15/24 [id number]

## 2025-04-01 ENCOUNTER — HOSPITAL ENCOUNTER (EMERGENCY)
Facility: HOSPITAL | Age: 35
Discharge: HOME | End: 2025-04-01

## 2025-04-01 VITALS
DIASTOLIC BLOOD PRESSURE: 75 MMHG | WEIGHT: 176 LBS | TEMPERATURE: 99 F | HEART RATE: 92 BPM | SYSTOLIC BLOOD PRESSURE: 114 MMHG | BODY MASS INDEX: 31.18 KG/M2 | OXYGEN SATURATION: 98 % | RESPIRATION RATE: 19 BRPM | HEIGHT: 63 IN

## 2025-04-01 DIAGNOSIS — N76.0 BV (BACTERIAL VAGINOSIS): Primary | ICD-10-CM

## 2025-04-01 DIAGNOSIS — B96.89 BV (BACTERIAL VAGINOSIS): Primary | ICD-10-CM

## 2025-04-01 LAB
B-HCG UR QL: NEGATIVE
BACTERIA VAG QL WET PREP: ABNORMAL /HPF
BILIRUB UR QL STRIP: NEGATIVE
CHLAMYDIA BY PCR: NEGATIVE
CLARITY UR: ABNORMAL
CLUE CELLS VAG QL WET PREP: ABNORMAL /HPF
COLOR UR: YELLOW
CTP QC/QA: YES
GLUCOSE UR STRIP-MCNC: NORMAL MG/DL
KETONES UR STRIP-SCNC: NEGATIVE MG/DL
LEUKOCYTE ESTERASE UR QL STRIP: NEGATIVE
N. GONORRHOEAE (GC) BY PCR: NEGATIVE
NITRITE UR QL STRIP: NEGATIVE
PH UR STRIP: 6.5 PH UNITS
PROT UR QL STRIP: 10
RBC # UR STRIP: NEGATIVE /UL
RBC #/AREA VAG WET PREP: ABNORMAL /HPF
SP GR UR STRIP: 1.02
SQUAMOUS EPITHELIALS WET WOUNT, GENITAL: ABNORMAL /HPF
T VAGINALIS VAG QL WET PREP: ABNORMAL /HPF
UROBILINOGEN UR STRIP-ACNC: 2 MG/DL
WBC CLUMPS WET MOUNT, GENITAL: ABNORMAL /HPF
WBC VAG QL WET PREP: ABNORMAL /HPF
YEAST VAG QL WET PREP: ABNORMAL /HPF

## 2025-04-01 PROCEDURE — 63600175 PHARM REV CODE 636 W HCPCS: Performed by: NURSE PRACTITIONER

## 2025-04-01 PROCEDURE — 81025 URINE PREGNANCY TEST: CPT | Performed by: NURSE PRACTITIONER

## 2025-04-01 PROCEDURE — 87210 SMEAR WET MOUNT SALINE/INK: CPT | Performed by: NURSE PRACTITIONER

## 2025-04-01 PROCEDURE — 25000003 PHARM REV CODE 250: Performed by: NURSE PRACTITIONER

## 2025-04-01 PROCEDURE — 99284 EMERGENCY DEPT VISIT MOD MDM: CPT | Mod: 25

## 2025-04-01 PROCEDURE — 96372 THER/PROPH/DIAG INJ SC/IM: CPT | Performed by: NURSE PRACTITIONER

## 2025-04-01 PROCEDURE — 87591 N.GONORRHOEAE DNA AMP PROB: CPT | Performed by: NURSE PRACTITIONER

## 2025-04-01 PROCEDURE — 81003 URINALYSIS AUTO W/O SCOPE: CPT | Performed by: NURSE PRACTITIONER

## 2025-04-01 RX ORDER — CEFTRIAXONE 1 G/1
1 INJECTION, POWDER, FOR SOLUTION INTRAMUSCULAR; INTRAVENOUS
Status: COMPLETED | OUTPATIENT
Start: 2025-04-01 | End: 2025-04-01

## 2025-04-01 RX ORDER — METRONIDAZOLE 500 MG/1
500 TABLET ORAL 3 TIMES DAILY
Qty: 21 TABLET | Refills: 0 | Status: SHIPPED | OUTPATIENT
Start: 2025-04-01 | End: 2025-04-08

## 2025-04-01 RX ORDER — METRONIDAZOLE 500 MG/1
500 TABLET ORAL
Status: COMPLETED | OUTPATIENT
Start: 2025-04-01 | End: 2025-04-01

## 2025-04-01 RX ORDER — DOXYCYCLINE HYCLATE 100 MG
100 TABLET ORAL
Status: COMPLETED | OUTPATIENT
Start: 2025-04-01 | End: 2025-04-01

## 2025-04-01 RX ORDER — DOXYCYCLINE 100 MG/1
100 CAPSULE ORAL 2 TIMES DAILY
Qty: 20 CAPSULE | Refills: 0 | Status: SHIPPED | OUTPATIENT
Start: 2025-04-01 | End: 2025-04-11

## 2025-04-01 RX ADMIN — DOXYCYCLINE HYCLATE 100 MG: 100 TABLET, COATED ORAL at 04:04

## 2025-04-01 RX ADMIN — CEFTRIAXONE SODIUM 1 G: 1 INJECTION, POWDER, FOR SOLUTION INTRAMUSCULAR; INTRAVENOUS at 04:04

## 2025-04-01 RX ADMIN — METRONIDAZOLE 500 MG: 500 TABLET ORAL at 04:04

## 2025-04-01 NOTE — ED PROVIDER NOTES
Encounter Date: 2025       History     Chief Complaint   Patient presents with    Vaginal Itching     Patient presents to the ED with c/o vaginal itching that started 3 days ago. Patient denies any discharge burning/stinging upon uriantion     34-year-old female who presents to the emergency department to be evaluated for vaginal rash and itching that started 3 days ago, and is worsening.  She states she has not noticed any vaginal discharge.  She denies any fever, nausea, vomiting or diarrhea.    The history is provided by the patient.     Review of patient's allergies indicates:  No Known Allergies  No past medical history on file.  Past Surgical History:   Procedure Laterality Date    BILATERAL TUBAL LIGATION       SECTION      x 5    throat cyst      age 6     No family history on file.  Social History[1]  Review of Systems   Constitutional:  Negative for activity change, appetite change, chills, diaphoresis, fatigue, fever and unexpected weight change.   HENT: Negative.     Eyes: Negative.    Respiratory: Negative.  Negative for chest tightness, shortness of breath, wheezing and stridor.    Cardiovascular: Negative.    Gastrointestinal: Negative.    Endocrine: Negative.    Genitourinary:  Positive for vaginal discharge (milky white). Negative for difficulty urinating, flank pain, hematuria and pelvic pain.        Vaginal itching   Musculoskeletal: Negative.    Skin:  Positive for rash.   Allergic/Immunologic: Negative.    Neurological: Negative.    Hematological: Negative.    Psychiatric/Behavioral: Negative.         Physical Exam     Initial Vitals [25 1337]   BP Pulse Resp Temp SpO2   114/75 92 19 98.6 °F (37 °C) 98 %      MAP       --         Physical Exam    Vitals reviewed.  Constitutional: She appears well-developed and well-nourished. She is not diaphoretic. No distress.   HENT:   Head: Normocephalic and atraumatic.   Right Ear: External ear normal.   Left Ear: External ear normal.    Nose: Nose normal. Mouth/Throat: Oropharynx is clear and moist. No oropharyngeal exudate.   Eyes: Conjunctivae and EOM are normal. Pupils are equal, round, and reactive to light. Right eye exhibits no discharge. Left eye exhibits no discharge. No scleral icterus.   Neck: Neck supple. No tracheal deviation present.   Normal range of motion.  Cardiovascular:  Normal rate, regular rhythm and intact distal pulses.           Pulmonary/Chest: Breath sounds normal. No stridor. No respiratory distress. She has no wheezes. She has no rhonchi. She has no rales. She exhibits no tenderness.   Abdominal: Abdomen is soft. Bowel sounds are normal. She exhibits no distension. There is no abdominal tenderness. There is no guarding.   Genitourinary:    Uterus normal.      Pelvic exam was performed with patient supine.   No labial fusion. There is no rash, tenderness, lesion or injury on the right labia. There is no rash, tenderness, lesion or injury on the left labia. Cervix exhibits discharge. Cervix exhibits no motion tenderness. Right adnexum displays no mass, no tenderness and no fullness. Left adnexum displays no mass, no tenderness and no fullness.    Vaginal discharge (milky white) present.      No vaginal tenderness or bleeding.   No tenderness or bleeding in the vagina.    No foreign body in the vagina.      No signs of injury in the vagina.     Musculoskeletal:         General: No tenderness or edema. Normal range of motion.      Cervical back: Normal range of motion and neck supple.     Lymphadenopathy:     She has no cervical adenopathy.   Neurological: She is alert and oriented to person, place, and time. She has normal strength. No cranial nerve deficit or sensory deficit. GCS score is 15. GCS eye subscore is 4. GCS verbal subscore is 5. GCS motor subscore is 6.   Skin: Skin is warm and dry. Capillary refill takes less than 2 seconds.   Psychiatric: She has a normal mood and affect. Thought content normal.          Medical Screening Exam   See Full Note    ED Course   Procedures  Labs Reviewed   WET PREP, GENITAL - Abnormal       Result Value    WBC Wet [place] Rare (*)     RBC [place] None Seen      Bacteria [place] Moderate (*)     Clue Cell [place] Rare (*)     Yeast [place] None Seen      Trichomonas [place] None Seen      WBC Clumps [place] None Seen      Squamous Epithelials [place] Moderate (*)    URINALYSIS, REFLEX TO URINE CULTURE - Abnormal    Color, UA Yellow      Clarity, UA Turbid      pH, UA 6.5      Leukocytes, UA Negative      Nitrites, UA Negative      Protein, UA 10 (*)     Glucose, UA Normal      Ketones, UA Negative      Urobilinogen, UA 2 (*)     Bilirubin, UA Negative      Blood, UA Negative      Specific Gravity, UA 1.025     CHLAMYDIA/GONORRHOEAE(GC), PCR   POCT URINE PREGNANCY    POC Preg Test, Ur Negative       Acceptable Yes            Imaging Results    None          Medications   metroNIDAZOLE tablet 500 mg (has no administration in time range)   cefTRIAXone injection 1 g (has no administration in time range)   doxycycline tablet 100 mg (has no administration in time range)     Medical Decision Making  34-year-old female who presents to the emergency department to be evaluated for vaginal rash and itching that started 3 days ago, and is worsening.  She states she has not noticed any vaginal discharge.  She denies any fever, nausea, vomiting or diarrhea.    Labs ordered.    Speculum and bimanual exam performed with SAIRA Bauer as chaperone.  Moderate amount of milky white discharge noted at the cervical os and within the vagina.  Patient states she would like to be tested for gonorrhea and chlamydia in addition to the labs previously ordered.    Diagnosis: Bacterial vaginosis; exposure to GC chlamydia  Treated in the emergency department with Rocephin, Flagyl, doxycycline  Prescribed doxycycline and Flagyl    Amount and/or Complexity of Data Reviewed  Labs:  ordered.    Risk  Prescription drug management.                                      Clinical Impression:   Final diagnoses:  [N76.0, B96.89] BV (bacterial vaginosis) (Primary)        ED Disposition Condition    Discharge Stable          ED Prescriptions       Medication Sig Dispense Start Date End Date Auth. Provider    metroNIDAZOLE (FLAGYL) 500 MG tablet Take 1 tablet (500 mg total) by mouth 3 (three) times daily. for 7 days 21 tablet 4/1/2025 4/8/2025 Brandon Toth FNP    doxycycline (VIBRAMYCIN) 100 MG Cap Take 1 capsule (100 mg total) by mouth 2 (two) times daily. for 10 days 20 capsule 4/1/2025 4/11/2025 Brandon Toth FNP          Follow-up Information    None              [1]   Social History  Tobacco Use    Smoking status: Every Day     Types: Cigarettes     Passive exposure: Never    Smokeless tobacco: Never   Substance Use Topics    Alcohol use: Never    Drug use: Never        Brandon Toth FNP  04/01/25 161

## 2025-04-01 NOTE — DISCHARGE INSTRUCTIONS
Flagyl and doxycycline as prescribed and directed.  Check your MyChart for results of labs today.  Follow up with your primary care provider in 2 days. Return to the emergency department for any increase in symptoms or for any other new or worrisome symptoms.

## 2025-06-13 DIAGNOSIS — E04.1 THYROID NODULE: Primary | ICD-10-CM
